# Patient Record
Sex: FEMALE | Race: WHITE | NOT HISPANIC OR LATINO | URBAN - METROPOLITAN AREA
[De-identification: names, ages, dates, MRNs, and addresses within clinical notes are randomized per-mention and may not be internally consistent; named-entity substitution may affect disease eponyms.]

---

## 2019-05-06 ENCOUNTER — ON CAMPUS - OUTPATIENT (AMBULATORY)
Dept: URBAN - METROPOLITAN AREA HOSPITAL 2 | Facility: HOSPITAL | Age: 49
End: 2019-05-06
Payer: COMMERCIAL

## 2019-05-06 ENCOUNTER — OFFICE (AMBULATORY)
Dept: URBAN - METROPOLITAN AREA PATHOLOGY 4 | Facility: PATHOLOGY | Age: 49
End: 2019-05-06
Payer: COMMERCIAL

## 2019-05-06 VITALS
DIASTOLIC BLOOD PRESSURE: 71 MMHG | DIASTOLIC BLOOD PRESSURE: 72 MMHG | DIASTOLIC BLOOD PRESSURE: 65 MMHG | HEART RATE: 70 BPM | RESPIRATION RATE: 20 BRPM | SYSTOLIC BLOOD PRESSURE: 105 MMHG | HEART RATE: 75 BPM | SYSTOLIC BLOOD PRESSURE: 93 MMHG | HEART RATE: 66 BPM | SYSTOLIC BLOOD PRESSURE: 120 MMHG | OXYGEN SATURATION: 98 % | RESPIRATION RATE: 14 BRPM | RESPIRATION RATE: 18 BRPM | HEIGHT: 68 IN | DIASTOLIC BLOOD PRESSURE: 58 MMHG | DIASTOLIC BLOOD PRESSURE: 83 MMHG | DIASTOLIC BLOOD PRESSURE: 54 MMHG | OXYGEN SATURATION: 99 % | DIASTOLIC BLOOD PRESSURE: 77 MMHG | DIASTOLIC BLOOD PRESSURE: 60 MMHG | WEIGHT: 293 LBS | SYSTOLIC BLOOD PRESSURE: 110 MMHG | HEART RATE: 59 BPM | SYSTOLIC BLOOD PRESSURE: 142 MMHG | RESPIRATION RATE: 16 BRPM | TEMPERATURE: 97.5 F | HEART RATE: 64 BPM | DIASTOLIC BLOOD PRESSURE: 70 MMHG | DIASTOLIC BLOOD PRESSURE: 67 MMHG | HEART RATE: 67 BPM | OXYGEN SATURATION: 100 % | HEART RATE: 72 BPM | HEART RATE: 65 BPM | SYSTOLIC BLOOD PRESSURE: 98 MMHG | SYSTOLIC BLOOD PRESSURE: 129 MMHG | SYSTOLIC BLOOD PRESSURE: 97 MMHG | SYSTOLIC BLOOD PRESSURE: 111 MMHG | SYSTOLIC BLOOD PRESSURE: 114 MMHG

## 2019-05-06 DIAGNOSIS — K64.8 OTHER HEMORRHOIDS: ICD-10-CM

## 2019-05-06 DIAGNOSIS — D12.3 BENIGN NEOPLASM OF TRANSVERSE COLON: ICD-10-CM

## 2019-05-06 DIAGNOSIS — D12.5 BENIGN NEOPLASM OF SIGMOID COLON: ICD-10-CM

## 2019-05-06 DIAGNOSIS — Z80.0 FAMILY HISTORY OF MALIGNANT NEOPLASM OF DIGESTIVE ORGANS: ICD-10-CM

## 2019-05-06 DIAGNOSIS — K64.4 RESIDUAL HEMORRHOIDAL SKIN TAGS: ICD-10-CM

## 2019-05-06 LAB
GI HISTOLOGY: A. UNSPECIFIED: (no result)
GI HISTOLOGY: B. UNSPECIFIED: (no result)
GI HISTOLOGY: PDF REPORT: (no result)

## 2019-05-06 PROCEDURE — 88305 TISSUE EXAM BY PATHOLOGIST: CPT | Mod: 33 | Performed by: INTERNAL MEDICINE

## 2019-05-06 PROCEDURE — 45380 COLONOSCOPY AND BIOPSY: CPT | Mod: 59,33 | Performed by: INTERNAL MEDICINE

## 2019-05-06 PROCEDURE — 45385 COLONOSCOPY W/LESION REMOVAL: CPT | Mod: 33 | Performed by: INTERNAL MEDICINE

## 2019-05-06 PROCEDURE — 45380 COLONOSCOPY AND BIOPSY: CPT | Mod: 33,59 | Performed by: INTERNAL MEDICINE

## 2022-05-20 ENCOUNTER — ON CAMPUS - OUTPATIENT (AMBULATORY)
Dept: URBAN - METROPOLITAN AREA HOSPITAL 2 | Facility: HOSPITAL | Age: 52
End: 2022-05-20
Payer: COMMERCIAL

## 2022-05-20 ENCOUNTER — OFFICE (AMBULATORY)
Dept: URBAN - METROPOLITAN AREA PATHOLOGY 4 | Facility: PATHOLOGY | Age: 52
End: 2022-05-20
Payer: COMMERCIAL

## 2022-05-20 VITALS
DIASTOLIC BLOOD PRESSURE: 93 MMHG | OXYGEN SATURATION: 98 % | DIASTOLIC BLOOD PRESSURE: 52 MMHG | RESPIRATION RATE: 16 BRPM | TEMPERATURE: 97.8 F | RESPIRATION RATE: 17 BRPM | DIASTOLIC BLOOD PRESSURE: 85 MMHG | SYSTOLIC BLOOD PRESSURE: 128 MMHG | OXYGEN SATURATION: 97 % | HEART RATE: 59 BPM | HEART RATE: 60 BPM | OXYGEN SATURATION: 95 % | WEIGHT: 263 LBS | OXYGEN SATURATION: 100 % | HEART RATE: 66 BPM | SYSTOLIC BLOOD PRESSURE: 103 MMHG | HEART RATE: 57 BPM | OXYGEN SATURATION: 99 % | DIASTOLIC BLOOD PRESSURE: 72 MMHG | SYSTOLIC BLOOD PRESSURE: 118 MMHG | SYSTOLIC BLOOD PRESSURE: 121 MMHG | HEART RATE: 63 BPM | RESPIRATION RATE: 23 BRPM | DIASTOLIC BLOOD PRESSURE: 78 MMHG | SYSTOLIC BLOOD PRESSURE: 143 MMHG | RESPIRATION RATE: 15 BRPM | OXYGEN SATURATION: 96 % | SYSTOLIC BLOOD PRESSURE: 146 MMHG | HEIGHT: 68 IN | SYSTOLIC BLOOD PRESSURE: 126 MMHG | HEART RATE: 65 BPM | DIASTOLIC BLOOD PRESSURE: 60 MMHG | HEART RATE: 62 BPM

## 2022-05-20 DIAGNOSIS — D12.3 BENIGN NEOPLASM OF TRANSVERSE COLON: ICD-10-CM

## 2022-05-20 DIAGNOSIS — Z86.010 PERSONAL HISTORY OF COLONIC POLYPS: ICD-10-CM

## 2022-05-20 PROBLEM — K63.5 POLYP OF COLON: Status: ACTIVE | Noted: 2022-05-20

## 2022-05-20 LAB
GI HISTOLOGY: A. UNSPECIFIED: (no result)
GI HISTOLOGY: PDF REPORT: (no result)

## 2022-05-20 PROCEDURE — 88305 TISSUE EXAM BY PATHOLOGIST: CPT | Performed by: INTERNAL MEDICINE

## 2022-05-20 PROCEDURE — 45385 COLONOSCOPY W/LESION REMOVAL: CPT | Mod: 33 | Performed by: INTERNAL MEDICINE

## 2024-01-17 ENCOUNTER — OFFICE VISIT (OUTPATIENT)
Dept: PODIATRY | Facility: CLINIC | Age: 54
End: 2024-01-17
Payer: COMMERCIAL

## 2024-01-17 VITALS — BODY MASS INDEX: 42.74 KG/M2 | WEIGHT: 282 LBS | RESPIRATION RATE: 20 BRPM | HEIGHT: 68 IN

## 2024-01-17 DIAGNOSIS — M25.571 ACUTE RIGHT ANKLE PAIN: Primary | ICD-10-CM

## 2024-01-17 DIAGNOSIS — E66.01 MORBID OBESITY WITH BMI OF 40.0-44.9, ADULT: ICD-10-CM

## 2024-01-17 DIAGNOSIS — M21.861 ACQUIRED POSTERIOR EQUINUS, RIGHT: ICD-10-CM

## 2024-01-17 DIAGNOSIS — M77.31 CALCANEAL SPUR, RIGHT: ICD-10-CM

## 2024-01-17 DIAGNOSIS — M77.41 METATARSALGIA, RIGHT FOOT: ICD-10-CM

## 2024-01-17 DIAGNOSIS — M92.61 HAGLUND'S DEFORMITY, RIGHT: ICD-10-CM

## 2024-01-17 DIAGNOSIS — M76.61 ACHILLES TENDINITIS, RIGHT LEG: ICD-10-CM

## 2024-01-17 RX ORDER — BACLOFEN 10 MG/1
TABLET ORAL
COMMUNITY
Start: 2023-11-30

## 2024-01-17 RX ORDER — PREDNISONE 20 MG/1
TABLET ORAL
COMMUNITY
Start: 2023-10-27

## 2024-01-17 RX ORDER — TRIAMCINOLONE ACETONIDE 40 MG/ML
20 INJECTION, SUSPENSION INTRA-ARTICULAR; INTRAMUSCULAR ONCE
Status: COMPLETED | OUTPATIENT
Start: 2024-01-17 | End: 2024-01-17

## 2024-01-17 RX ADMIN — TRIAMCINOLONE ACETONIDE 20 MG: 40 INJECTION, SUSPENSION INTRA-ARTICULAR; INTRAMUSCULAR at 10:28

## 2024-01-17 NOTE — PROGRESS NOTES
01/17/2024  Foot and Ankle Surgery - New Patient   Provider: Dr. Donald Emanuel DPM  Location: [facility]    Subjective:  Alfreda Mcgowan is a 53 y.o. female.     Chief Complaint   Patient presents with    Right Ankle - Pain    Initial Evaluation     PALMER Larios md 10/15/2023       HPI:   The patient is a 53-year-old female who presents to the office for issues involving the right lower extremity.       Right foot pain   The patient reports that her right heel has been swelling. She states that this has been going on for a few years. She reports that it will flare up and then go away. The patient states that this time it has been painful and swollen for months now. She notes that this flare up has been going on since 07/2023. She states that she had been seen for this issue 3 years ago when she broke her great toe. She was told it may have been an old injury to the tendon; it was possible she may have had tears in it. She notes that she used a bone stimulator for some time, however; the pain did not stop. The patient reports that the toe does not bend any longer. 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 She notes that the laast time she had to wear a boot it caused hip pain.     No Known Allergies    Past Medical History:   Diagnosis Date    Difficulty walking     Plantar fasciitis     Stress fracture        History reviewed. No pertinent surgical history.    Family History   Problem Relation Age of Onset    Cancer Mother         Lung cancer    Heart disease Mother     Hypertension Mother     Cancer Father         Colon    Cancer Sister         Lung    Cancer Maternal Aunt         Breast       Social History     Socioeconomic History    Marital status:    Tobacco Use    Smoking status: Never     Passive exposure: Never    Smokeless tobacco: Never   Vaping Use    Vaping Use: Never used   Substance and Sexual Activity    Alcohol use: Yes     Alcohol/week: 2.0  standard drinks of alcohol     Types: 2 Standard drinks or equivalent per week    Drug use: Never    Sexual activity: Yes     Partners: Male     Birth control/protection: Hysterectomy        Current Outpatient Medications on File Prior to Visit   Medication Sig Dispense Refill    cetirizine (zyrTEC) 10 MG tablet Take 1 tablet by mouth Daily.      hydroCHLOROthiazide (HYDRODIURIL) 25 MG tablet Take 1 tablet by mouth Daily.      levothyroxine (SYNTHROID, LEVOTHROID) 125 MCG tablet Take 1 tablet by mouth Daily.      LORazepam (ATIVAN) 0.5 MG tablet TAKE 1 TABLET BY MOUTH DAILY AS NEEDED FOR PANIC ATTACK      rosuvastatin (CRESTOR) 5 MG tablet Take 1 tablet by mouth Daily.      sertraline (ZOLOFT) 100 MG tablet TAKE 1 TABLET BY MOUTH EVERY DAY FOR MOOD      baclofen (LIORESAL) 10 MG tablet 1 (ONE) TABLET THREE TIMES DAILY, AS NEEDED FOR BACK (Patient not taking: Reported on 1/17/2024)      predniSONE (DELTASONE) 20 MG tablet TAKE 3 (THREE) TABLET DAILY X 2 DAYS THEN 2 TABLETS DAILY X 2 DAYS THEN ONE DAILY (Patient not taking: Reported on 1/17/2024)       No current facility-administered medications on file prior to visit.       Review of Systems:  General: Denies fever, chills, fatigue, and weakness.  Eyes: Denies vision loss, blurry vision, and excessive redness.  ENT: Denies hearing issues and difficulty swallowing.  Cardiovascular: Denies palpitations, chest pain, or syncopal episodes.  Respiratory: Denies shortness of breath, wheezing, and coughing.  GI: Denies abdominal pain, nausea, and vomiting.   : Denies frequency, hematuria, and urgency.  Musculoskeletal: Denies muscle cramps, joint pains, and stiffness.  Derm: Denies rash, open wounds, or suspicious lesions.  Neuro: Denies headaches, numbness, loss of coordination, and tremors.  Psych: Denies anxiety and depression.  Endocrine: Denies temperature intolerance and changes in appetite.  Heme: Denies bleeding disorders or abnormal bruising.     Objective   Resp  "20   Ht 172.7 cm (68\")   Wt 128 kg (282 lb)   BMI 42.88 kg/m²     Foot/Ankle Exam    GENERAL  Appearance:  appears stated age and obese  Orientation:  AAOx3  Affect:  appropriate  Gait:  unimpaired  Assistance:  independent  Right shoe gear: casual shoe  Left shoe gear: casual shoe    VASCULAR     Right Foot Vascularity   Normal vascular exam    Dorsalis pedis:  2+  Posterior tibial:  2+  Skin temperature:  warm  Edema grading:  None  CFT:  < 3 seconds  Pedal hair growth:  Present  Varicosities:  none     Left Foot Vascularity   Normal vascular exam    Dorsalis pedis:  2+  Posterior tibial:  2+  Skin temperature:  warm  Edema grading:  None  CFT:  < 3 seconds  Pedal hair growth:  Present  Varicosities:  none     NEUROLOGIC     Right Foot Neurologic   Normal sensation    Light touch sensation: normal  Vibratory sensation: normal  Hot/Cold sensation: normal  Normal reflexes    Achilles reflex:  2+  Babinski reflex:  2+     Left Foot Neurologic   Normal sensation    Light touch sensation: normal  Vibratory sensation: normal  Hot/Cold sensation:  normal  Normal reflexes    Achilles reflex:  2+  Babinski reflex:  2+    MUSCULOSKELETAL     Right Foot Musculoskeletal   Ecchymosis:  none  Arch:  Normal     Left Foot Musculoskeletal   Ecchymosis:  none  Arch:  Normal    MUSCLE STRENGTH     Right Foot Muscle Strength   Normal strength    Foot dorsiflexion:  5  Foot plantar flexion:  5  Foot inversion:  5  Foot eversion:  5     Left Foot Muscle Strength   Normal strength    Foot dorsiflexion:  5  Foot plantar flexion:  5  Foot inversion:  5  Foot eversion:  5    RANGE OF MOTION     Right Foot Range of Motion   Foot and ankle ROM within normal limits       Left Foot Range of Motion   Foot and ankle ROM within normal limits      DERMATOLOGIC      Right Foot Dermatologic   Skin  Right foot skin is intact.      Left Foot Dermatologic   Skin  Left foot skin is intact.     01/17/2024  Prominent discomfort with palpation involving " the posterior aspect of the right calcaneus. Moderate hypertrophy involving the Achilles tendon insertion no obvious defect involving the Achilles tendon. Discomfort involving the forefoot plantar forefoot, moderate soft tissue rigidity and equinus contracture. No obvious deformity or instability. Insert Achilles or retrocalcaneal injection template.     Assessment & Plan   Diagnoses and all orders for this visit:    1. Acute right ankle pain (Primary)  -     XR Ankle 3+ View Right  -     Injection Tendon or Ligament  -     triamcinolone acetonide (KENALOG-40) injection 20 mg    2. Achilles tendinitis, right leg  -     XR Toe 2+ View Right    3. Romi's deformity, right    4. Acquired posterior equinus, right    5. Metatarsalgia, right foot    6. Calcaneal spur, right    7. Morbid obesity with BMI of 40.0-44.9, adult      Patient is a 53-year-old female that presents with longstanding pain and discomfort involving the posterior aspect of the right heel.  She states that these issues have been bothering her for several years intermittently.  She has seen somebody in the past for these issues.  Today, she has significant swelling and discomfort limiting her overall activity.  I reviewed the diagnoses, treatment options, biomechanics of these issues.  I have suggested that she consider a steroid injection today for symptom management.  We reviewed the risks and benefits.  Procedure was performed without complication.  I have also advised her on appropriate shoes and inserts.  We discussed proper activity level and to avoid high-impact and uneven terrain.  I do feel that she would benefit from low impact exercise.  We discussed RICE therapy and proper use of OTC anti-inflammatories.  I have suggested that she start routine stretching and manual therapy exercises.  We did discuss consideration of physical therapy but she does not want to consider at this time.  I have asked that she follow-up with me in 4 to 6 weeks  for reevaluation and further planning.  We did briefly discuss surgical options.    Retrocalcaneal/ Achilles Steroid Injection: Right foot    Consent and time out was performed before proceeding with injection.  The skin about the lateral Kagar's triangle region of the right foot was cleansed with alcohol.  Ultrasound guidance was used to evaluate the Achilles tendon and injection guidance. Using an aseptic technique, a 1.5 mL solution containing 0.5 mL of 0.5% Marcaine plain, 0.5mL of 1% lidocaine plain and 0.5 mL of Kenalog was injected to the insertion site of the Achilles tendon. After the injection, compression was applied followed by a sterile bandage.  The patient noted relief from pain and tolerated the injection well without complication.    Greater than 30 minutes spent before coming to coming after evaluation for patient care.    Orders Placed This Encounter   Procedures    Injection Tendon or Ligament     Order Specific Question:   Release to patient     Answer:   Routine Release [[id number]]    XR Ankle 3+ View Right     Order Specific Question:   Reason for Exam:     Answer:   pain in achilles rm 13 wb     Order Specific Question:   Release to patient     Answer:   Routine Release [[id number]]    XR Toe 2+ View Right     Order Specific Question:   Reason for Exam:     Answer:   prev great toe fx rm 13 wb     Order Specific Question:   Release to patient     Answer:   Routine Release [[id number]]        Note is dictated utilizing voice recognition software. Unfortunately this leads to occasional typographical errors. I apologize in advance if the situation occurs. If questions occur please do not hesitate to call our office.    Transcribed from ambient dictation for MARY Emanuel DPM by Haley Lee.  01/17/24   09:45 EST    Patient or patient representative verbalized consent to the visit recording.

## 2024-01-18 ENCOUNTER — PATIENT ROUNDING (BHMG ONLY) (OUTPATIENT)
Dept: ORTHOPEDIC SURGERY | Facility: CLINIC | Age: 54
End: 2024-01-18
Payer: COMMERCIAL

## 2024-01-18 NOTE — PROGRESS NOTES
A My-Chart message has been sent to the patient for PATIENT ROUNDING with [facility]

## 2024-02-22 ENCOUNTER — OFFICE VISIT (OUTPATIENT)
Dept: PODIATRY | Facility: CLINIC | Age: 54
End: 2024-02-22
Payer: COMMERCIAL

## 2024-02-22 VITALS — HEART RATE: 88 BPM | HEIGHT: 68 IN | WEIGHT: 266 LBS | BODY MASS INDEX: 40.32 KG/M2 | RESPIRATION RATE: 20 BRPM

## 2024-02-22 DIAGNOSIS — M92.61 HAGLUND'S DEFORMITY, RIGHT: ICD-10-CM

## 2024-02-22 DIAGNOSIS — E66.01 MORBID OBESITY WITH BMI OF 40.0-44.9, ADULT: ICD-10-CM

## 2024-02-22 DIAGNOSIS — M77.41 METATARSALGIA, RIGHT FOOT: ICD-10-CM

## 2024-02-22 DIAGNOSIS — M76.61 ACHILLES TENDINITIS, RIGHT LEG: ICD-10-CM

## 2024-02-22 DIAGNOSIS — M25.571 ACUTE RIGHT ANKLE PAIN: Primary | ICD-10-CM

## 2024-02-22 DIAGNOSIS — M21.861 ACQUIRED POSTERIOR EQUINUS, RIGHT: ICD-10-CM

## 2024-02-22 DIAGNOSIS — M77.31 CALCANEAL SPUR, RIGHT: ICD-10-CM

## 2024-02-23 NOTE — PROGRESS NOTES
"02/22/2024  Foot and Ankle Surgery - Established Patient/Follow-up  Provider: Dr. Donald Emanuel DPM  Location: [facility]    Subjective:  Alfreda Mcgowan is a 53 y.o. female.     Chief Complaint   Patient presents with    Right Ankle - Follow-up, Pain    Follow-up     PALMER Larios md  10/15/2023       HPI: Patient returns approximate month after steroid injection with continued pain involving the posterior aspect of the heel.  She did notice some improvement after the steroid injection but feels that symptoms are getting worse.  She is tired of dealing with the pain and limitation would like to discuss alternative options.    No Known Allergies    Current Outpatient Medications on File Prior to Visit   Medication Sig Dispense Refill    baclofen (LIORESAL) 10 MG tablet       cetirizine (zyrTEC) 10 MG tablet Take 1 tablet by mouth Daily.      hydroCHLOROthiazide (HYDRODIURIL) 25 MG tablet Take 1 tablet by mouth Daily.      levothyroxine (SYNTHROID, LEVOTHROID) 125 MCG tablet Take 1 tablet by mouth Daily.      LORazepam (ATIVAN) 0.5 MG tablet TAKE 1 TABLET BY MOUTH DAILY AS NEEDED FOR PANIC ATTACK      rosuvastatin (CRESTOR) 5 MG tablet Take 1 tablet by mouth Daily.      sertraline (ZOLOFT) 100 MG tablet TAKE 1 TABLET BY MOUTH EVERY DAY FOR MOOD      predniSONE (DELTASONE) 20 MG tablet TAKE 3 (THREE) TABLET DAILY X 2 DAYS THEN 2 TABLETS DAILY X 2 DAYS THEN ONE DAILY (Patient not taking: Reported on 1/17/2024)       No current facility-administered medications on file prior to visit.       Objective   Pulse 88   Resp 20   Ht 172.7 cm (68\")   Wt 121 kg (266 lb)   BMI 40.45 kg/m²     GENERAL  Appearance:  appears stated age and obese  Orientation:  AAOx3  Affect:  appropriate  Gait:  unimpaired  Assistance:  independent  Right shoe gear: casual shoe  Left shoe gear: casual shoe     VASCULAR      Right Foot Vascularity   Normal vascular exam    Dorsalis pedis:  2+  Posterior tibial:  2+  Skin temperature:  " warm  Edema grading:  None  CFT:  < 3 seconds  Pedal hair growth:  Present  Varicosities:  none      Left Foot Vascularity   Normal vascular exam    Dorsalis pedis:  2+  Posterior tibial:  2+  Skin temperature:  warm  Edema grading:  None  CFT:  < 3 seconds  Pedal hair growth:  Present  Varicosities:  none     NEUROLOGIC      Right Foot Neurologic   Normal sensation    Light touch sensation: normal  Vibratory sensation: normal  Hot/Cold sensation: normal  Normal reflexes    Achilles reflex:  2+  Babinski reflex:  2+      Left Foot Neurologic   Normal sensation    Light touch sensation: normal  Vibratory sensation: normal  Hot/Cold sensation:  normal  Normal reflexes    Achilles reflex:  2+  Babinski reflex:  2+     MUSCULOSKELETAL      Right Foot Musculoskeletal   Ecchymosis:  none  Arch:  Normal      Left Foot Musculoskeletal   Ecchymosis:  none  Arch:  Normal     MUSCLE STRENGTH      Right Foot Muscle Strength   Normal strength    Foot dorsiflexion:  5  Foot plantar flexion:  5  Foot inversion:  5  Foot eversion:  5      Left Foot Muscle Strength   Normal strength    Foot dorsiflexion:  5  Foot plantar flexion:  5  Foot inversion:  5  Foot eversion:  5     RANGE OF MOTION      Right Foot Range of Motion   Foot and ankle ROM within normal limits        Left Foot Range of Motion   Foot and ankle ROM within normal limits       DERMATOLOGIC       Right Foot Dermatologic   Skin  Right foot skin is intact.       Left Foot Dermatologic   Skin  Left foot skin is intact.      01/17/2024  Prominent discomfort with palpation involving the posterior aspect of the right calcaneus. Moderate hypertrophy involving the Achilles tendon insertion no obvious defect involving the Achilles tendon. Discomfort involving the forefoot plantar forefoot, moderate soft tissue rigidity and equinus contracture. No obvious deformity or instability. Insert Achilles or retrocalcaneal injection template.     2/23/24: Continued swelling and  discomfort involving the posterior aspect of the heel.  No Achilles tendon defect.  Moderate hypertrophy    Assessment & Plan   Diagnoses and all orders for this visit:    1. Acute right ankle pain (Primary)    2. Achilles tendinitis, right leg  -     Case Request; Standing  -     CBC (No Diff); Future  -     Basic Metabolic Panel; Future  -     XR Chest 2 View; Future  -     ECG 12 Lead; Future  -     ceFAZolin (ANCEF) 3,000 mg in sodium chloride 0.9 % 100 mL IVPB  -     Case Request    3. Romi's deformity, right  -     Case Request; Standing  -     CBC (No Diff); Future  -     Basic Metabolic Panel; Future  -     XR Chest 2 View; Future  -     ECG 12 Lead; Future  -     ceFAZolin (ANCEF) 3,000 mg in sodium chloride 0.9 % 100 mL IVPB  -     Case Request    4. Acquired posterior equinus, right  -     Case Request; Standing  -     CBC (No Diff); Future  -     Basic Metabolic Panel; Future  -     XR Chest 2 View; Future  -     ECG 12 Lead; Future  -     ceFAZolin (ANCEF) 3,000 mg in sodium chloride 0.9 % 100 mL IVPB  -     Case Request    5. Metatarsalgia, right foot    6. Calcaneal spur, right    7. Morbid obesity with BMI of 40.0-44.9, adult    Other orders  -     Follow Anesthesia Guidelines / Protocol; Future  -     Follow Anesthesia Guidelines / Protocol; Standing  -     Verify / Perform Chlorhexidine Skin Prep; Standing  -     Verify / Perform Chlorhexidine Skin Prep if Indicated (If Not Already Completed); Standing  -     Obtain Informed Consent; Future  -     Provide NPO Instructions to Patient; Future  -     Chlorhexidine Skin Prep; Future      Patient returns with continued pain involving the posterior aspect of the right heel.  She is worried that symptoms are getting worse.  She has not noticed any significant improvement after the steroid injection.  She continues to have prominent tightness involving the posterior aspect of the leg and pain to the heel with weightbearing activities.  She has been  wearing the inserts and offloading shoes.  I did discuss alternative options with her which would require gastrocnemius recession and Romi's removal.  I discussed the procedures, risk, goals, and recovery with her at length.  Patient understands and would like to proceed.  I have suggested that we consider surgery in the near future.  Patient is to decrease overall activity during that timeframe.  We did discuss rice therapy and proper use of OTC anti-inflammatories.  Greater than 30 minutes spent before, during, and after evaluation for patient care.    Orders Placed This Encounter   Procedures    XR Chest 2 View     Standing Status:   Future     Standing Expiration Date:   2/22/2025     Order Specific Question:   Reason for Exam:     Answer:   Preop     Order Specific Question:   Release to patient     Answer:   Routine Release [[id number]]    CBC (No Diff)     Standing Status:   Future     Standing Expiration Date:   2/22/2025     Order Specific Question:   Release to patient     Answer:   Routine Release [[id number]]    Basic Metabolic Panel     Standing Status:   Future     Standing Expiration Date:   2/22/2025     Order Specific Question:   Release to patient     Answer:   Routine Release [[id number]]    Obtain Informed Consent     Standing Status:   Future     Order Specific Question:   Informed Consent Given For     Answer:   Gastrocnemius recession with Romi's removal to the right lower extremity    Provide NPO Instructions to Patient     Standing Status:   Future    Chlorhexidine Skin Prep     Chlorhexidine Skin Prep and Instructions For All Patients Having A Procedure Requiring an Outward Incision if Not Allergic. If Allergic, Give Antibacterial Skin Wipes and Instructions. Do Not Use For Facial Cases or on Any Mucus Membranes.     Standing Status:   Future    ECG 12 Lead     Standing Status:   Future     Standing Expiration Date:   2/22/2025     Order Specific Question:   Reason for Exam:      Answer:   Preop     Order Specific Question:   Release to patient     Answer:   Routine Release [[id number]]          Note is dictated utilizing voice recognition software. Unfortunately this leads to occasional typographical errors. I apologize in advance if the situation occurs. If questions occur please do not hesitate to call our office.

## 2024-02-23 NOTE — H&P (VIEW-ONLY)
"02/22/2024  Foot and Ankle Surgery - Established Patient/Follow-up  Provider: Dr. Donald Emanuel DPM  Location: [facility]    Subjective:  Alfreda Mcgowan is a 53 y.o. female.     Chief Complaint   Patient presents with    Right Ankle - Follow-up, Pain    Follow-up     PALMER Larios md  10/15/2023       HPI: Patient returns approximate month after steroid injection with continued pain involving the posterior aspect of the heel.  She did notice some improvement after the steroid injection but feels that symptoms are getting worse.  She is tired of dealing with the pain and limitation would like to discuss alternative options.    No Known Allergies    Current Outpatient Medications on File Prior to Visit   Medication Sig Dispense Refill    baclofen (LIORESAL) 10 MG tablet       cetirizine (zyrTEC) 10 MG tablet Take 1 tablet by mouth Daily.      hydroCHLOROthiazide (HYDRODIURIL) 25 MG tablet Take 1 tablet by mouth Daily.      levothyroxine (SYNTHROID, LEVOTHROID) 125 MCG tablet Take 1 tablet by mouth Daily.      LORazepam (ATIVAN) 0.5 MG tablet TAKE 1 TABLET BY MOUTH DAILY AS NEEDED FOR PANIC ATTACK      rosuvastatin (CRESTOR) 5 MG tablet Take 1 tablet by mouth Daily.      sertraline (ZOLOFT) 100 MG tablet TAKE 1 TABLET BY MOUTH EVERY DAY FOR MOOD      predniSONE (DELTASONE) 20 MG tablet TAKE 3 (THREE) TABLET DAILY X 2 DAYS THEN 2 TABLETS DAILY X 2 DAYS THEN ONE DAILY (Patient not taking: Reported on 1/17/2024)       No current facility-administered medications on file prior to visit.       Objective   Pulse 88   Resp 20   Ht 172.7 cm (68\")   Wt 121 kg (266 lb)   BMI 40.45 kg/m²     GENERAL  Appearance:  appears stated age and obese  Orientation:  AAOx3  Affect:  appropriate  Gait:  unimpaired  Assistance:  independent  Right shoe gear: casual shoe  Left shoe gear: casual shoe     VASCULAR      Right Foot Vascularity   Normal vascular exam    Dorsalis pedis:  2+  Posterior tibial:  2+  Skin temperature:  " warm  Edema grading:  None  CFT:  < 3 seconds  Pedal hair growth:  Present  Varicosities:  none      Left Foot Vascularity   Normal vascular exam    Dorsalis pedis:  2+  Posterior tibial:  2+  Skin temperature:  warm  Edema grading:  None  CFT:  < 3 seconds  Pedal hair growth:  Present  Varicosities:  none     NEUROLOGIC      Right Foot Neurologic   Normal sensation    Light touch sensation: normal  Vibratory sensation: normal  Hot/Cold sensation: normal  Normal reflexes    Achilles reflex:  2+  Babinski reflex:  2+      Left Foot Neurologic   Normal sensation    Light touch sensation: normal  Vibratory sensation: normal  Hot/Cold sensation:  normal  Normal reflexes    Achilles reflex:  2+  Babinski reflex:  2+     MUSCULOSKELETAL      Right Foot Musculoskeletal   Ecchymosis:  none  Arch:  Normal      Left Foot Musculoskeletal   Ecchymosis:  none  Arch:  Normal     MUSCLE STRENGTH      Right Foot Muscle Strength   Normal strength    Foot dorsiflexion:  5  Foot plantar flexion:  5  Foot inversion:  5  Foot eversion:  5      Left Foot Muscle Strength   Normal strength    Foot dorsiflexion:  5  Foot plantar flexion:  5  Foot inversion:  5  Foot eversion:  5     RANGE OF MOTION      Right Foot Range of Motion   Foot and ankle ROM within normal limits        Left Foot Range of Motion   Foot and ankle ROM within normal limits       DERMATOLOGIC       Right Foot Dermatologic   Skin  Right foot skin is intact.       Left Foot Dermatologic   Skin  Left foot skin is intact.      01/17/2024  Prominent discomfort with palpation involving the posterior aspect of the right calcaneus. Moderate hypertrophy involving the Achilles tendon insertion no obvious defect involving the Achilles tendon. Discomfort involving the forefoot plantar forefoot, moderate soft tissue rigidity and equinus contracture. No obvious deformity or instability. Insert Achilles or retrocalcaneal injection template.     2/23/24: Continued swelling and  discomfort involving the posterior aspect of the heel.  No Achilles tendon defect.  Moderate hypertrophy    Assessment & Plan   Diagnoses and all orders for this visit:    1. Acute right ankle pain (Primary)    2. Achilles tendinitis, right leg  -     Case Request; Standing  -     CBC (No Diff); Future  -     Basic Metabolic Panel; Future  -     XR Chest 2 View; Future  -     ECG 12 Lead; Future  -     ceFAZolin (ANCEF) 3,000 mg in sodium chloride 0.9 % 100 mL IVPB  -     Case Request    3. Romi's deformity, right  -     Case Request; Standing  -     CBC (No Diff); Future  -     Basic Metabolic Panel; Future  -     XR Chest 2 View; Future  -     ECG 12 Lead; Future  -     ceFAZolin (ANCEF) 3,000 mg in sodium chloride 0.9 % 100 mL IVPB  -     Case Request    4. Acquired posterior equinus, right  -     Case Request; Standing  -     CBC (No Diff); Future  -     Basic Metabolic Panel; Future  -     XR Chest 2 View; Future  -     ECG 12 Lead; Future  -     ceFAZolin (ANCEF) 3,000 mg in sodium chloride 0.9 % 100 mL IVPB  -     Case Request    5. Metatarsalgia, right foot    6. Calcaneal spur, right    7. Morbid obesity with BMI of 40.0-44.9, adult    Other orders  -     Follow Anesthesia Guidelines / Protocol; Future  -     Follow Anesthesia Guidelines / Protocol; Standing  -     Verify / Perform Chlorhexidine Skin Prep; Standing  -     Verify / Perform Chlorhexidine Skin Prep if Indicated (If Not Already Completed); Standing  -     Obtain Informed Consent; Future  -     Provide NPO Instructions to Patient; Future  -     Chlorhexidine Skin Prep; Future      Patient returns with continued pain involving the posterior aspect of the right heel.  She is worried that symptoms are getting worse.  She has not noticed any significant improvement after the steroid injection.  She continues to have prominent tightness involving the posterior aspect of the leg and pain to the heel with weightbearing activities.  She has been  wearing the inserts and offloading shoes.  I did discuss alternative options with her which would require gastrocnemius recession and Romi's removal.  I discussed the procedures, risk, goals, and recovery with her at length.  Patient understands and would like to proceed.  I have suggested that we consider surgery in the near future.  Patient is to decrease overall activity during that timeframe.  We did discuss rice therapy and proper use of OTC anti-inflammatories.  Greater than 30 minutes spent before, during, and after evaluation for patient care.    Orders Placed This Encounter   Procedures    XR Chest 2 View     Standing Status:   Future     Standing Expiration Date:   2/22/2025     Order Specific Question:   Reason for Exam:     Answer:   Preop     Order Specific Question:   Release to patient     Answer:   Routine Release [[id number]]    CBC (No Diff)     Standing Status:   Future     Standing Expiration Date:   2/22/2025     Order Specific Question:   Release to patient     Answer:   Routine Release [[id number]]    Basic Metabolic Panel     Standing Status:   Future     Standing Expiration Date:   2/22/2025     Order Specific Question:   Release to patient     Answer:   Routine Release [[id number]]    Obtain Informed Consent     Standing Status:   Future     Order Specific Question:   Informed Consent Given For     Answer:   Gastrocnemius recession with Romi's removal to the right lower extremity    Provide NPO Instructions to Patient     Standing Status:   Future    Chlorhexidine Skin Prep     Chlorhexidine Skin Prep and Instructions For All Patients Having A Procedure Requiring an Outward Incision if Not Allergic. If Allergic, Give Antibacterial Skin Wipes and Instructions. Do Not Use For Facial Cases or on Any Mucus Membranes.     Standing Status:   Future    ECG 12 Lead     Standing Status:   Future     Standing Expiration Date:   2/22/2025     Order Specific Question:   Reason for Exam:      Answer:   Preop     Order Specific Question:   Release to patient     Answer:   Routine Release [[id number]]          Note is dictated utilizing voice recognition software. Unfortunately this leads to occasional typographical errors. I apologize in advance if the situation occurs. If questions occur please do not hesitate to call our office.

## 2024-02-27 ENCOUNTER — TELEPHONE (OUTPATIENT)
Dept: PODIATRY | Facility: CLINIC | Age: 54
End: 2024-02-27
Payer: COMMERCIAL

## 2024-02-27 PROBLEM — M21.861 ACQUIRED POSTERIOR EQUINUS, RIGHT: Status: ACTIVE | Noted: 2024-02-22

## 2024-02-27 PROBLEM — M76.61 ACHILLES TENDINITIS, RIGHT LEG: Status: ACTIVE | Noted: 2024-02-22

## 2024-02-27 PROBLEM — M92.61 HAGLUND'S DEFORMITY, RIGHT: Status: ACTIVE | Noted: 2024-02-22

## 2024-02-27 NOTE — TELEPHONE ENCOUNTER
Caller: Alfreda Quinonez    Relationship to patient: Self    Best call back number:     Chief complaint: RT ANKLE    Type of visit: RECESSION GASTROCNEMIUS SX    Requested date: ASAP    Additional notes:MS QUINONEZ WOULD LIKE A CALL BACK TO SCHEDULE SX

## 2024-02-29 ENCOUNTER — HOSPITAL ENCOUNTER (OUTPATIENT)
Dept: CARDIOLOGY | Facility: HOSPITAL | Age: 54
Discharge: HOME | End: 2024-02-29
Payer: COMMERCIAL

## 2024-02-29 ENCOUNTER — LAB (OUTPATIENT)
Dept: LAB | Facility: HOSPITAL | Age: 54
End: 2024-02-29
Payer: COMMERCIAL

## 2024-02-29 ENCOUNTER — HOSPITAL ENCOUNTER (OUTPATIENT)
Dept: GENERAL RADIOLOGY | Facility: HOSPITAL | Age: 54
Discharge: HOME | End: 2024-02-29
Payer: COMMERCIAL

## 2024-02-29 DIAGNOSIS — M76.61 ACHILLES TENDINITIS, RIGHT LEG: ICD-10-CM

## 2024-02-29 DIAGNOSIS — M21.861 ACQUIRED POSTERIOR EQUINUS, RIGHT: ICD-10-CM

## 2024-02-29 DIAGNOSIS — M92.61 HAGLUND'S DEFORMITY, RIGHT: ICD-10-CM

## 2024-02-29 LAB
ANION GAP SERPL CALCULATED.3IONS-SCNC: 10.4 MMOL/L (ref 5–15)
BUN SERPL-MCNC: 19 MG/DL (ref 6–20)
BUN/CREAT SERPL: 29.7 (ref 7–25)
CALCIUM SPEC-SCNC: 9.2 MG/DL (ref 8.6–10.5)
CHLORIDE SERPL-SCNC: 105 MMOL/L (ref 98–107)
CO2 SERPL-SCNC: 28.6 MMOL/L (ref 22–29)
CREAT SERPL-MCNC: 0.64 MG/DL (ref 0.57–1)
DEPRECATED RDW RBC AUTO: 40 FL (ref 37–54)
EGFRCR SERPLBLD CKD-EPI 2021: 105.8 ML/MIN/1.73
ERYTHROCYTE [DISTWIDTH] IN BLOOD BY AUTOMATED COUNT: 13 % (ref 12.3–15.4)
GLUCOSE SERPL-MCNC: 83 MG/DL (ref 65–99)
HCT VFR BLD AUTO: 38.7 % (ref 34–46.6)
HGB BLD-MCNC: 12.5 G/DL (ref 12–15.9)
MCH RBC QN AUTO: 27.4 PG (ref 26.6–33)
MCHC RBC AUTO-ENTMCNC: 32.3 G/DL (ref 31.5–35.7)
MCV RBC AUTO: 84.9 FL (ref 79–97)
PLATELET # BLD AUTO: 284 10*3/MM3 (ref 140–450)
PMV BLD AUTO: 10.2 FL (ref 6–12)
POTASSIUM SERPL-SCNC: 3.9 MMOL/L (ref 3.5–5.2)
RBC # BLD AUTO: 4.56 10*6/MM3 (ref 3.77–5.28)
SODIUM SERPL-SCNC: 144 MMOL/L (ref 136–145)
WBC NRBC COR # BLD AUTO: 6.63 10*3/MM3 (ref 3.4–10.8)

## 2024-02-29 PROCEDURE — 93005 ELECTROCARDIOGRAM TRACING: CPT | Performed by: PODIATRIST

## 2024-02-29 PROCEDURE — 85027 COMPLETE CBC AUTOMATED: CPT

## 2024-02-29 PROCEDURE — 80048 BASIC METABOLIC PNL TOTAL CA: CPT

## 2024-02-29 PROCEDURE — 71046 X-RAY EXAM CHEST 2 VIEWS: CPT

## 2024-02-29 PROCEDURE — 36415 COLL VENOUS BLD VENIPUNCTURE: CPT

## 2024-03-01 LAB
QT INTERVAL: 466 MS
QTC INTERVAL: 438 MS

## 2024-03-08 ENCOUNTER — ANESTHESIA (OUTPATIENT)
Dept: PERIOP | Facility: HOSPITAL | Age: 54
End: 2024-03-08
Payer: COMMERCIAL

## 2024-03-08 ENCOUNTER — ANESTHESIA EVENT (OUTPATIENT)
Dept: PERIOP | Facility: HOSPITAL | Age: 54
End: 2024-03-08
Payer: COMMERCIAL

## 2024-03-08 ENCOUNTER — HOSPITAL ENCOUNTER (OUTPATIENT)
Facility: HOSPITAL | Age: 54
Setting detail: HOSPITAL OUTPATIENT SURGERY
Discharge: HOME | End: 2024-03-08
Attending: PODIATRIST | Admitting: PODIATRIST
Payer: COMMERCIAL

## 2024-03-08 VITALS
OXYGEN SATURATION: 94 % | SYSTOLIC BLOOD PRESSURE: 122 MMHG | DIASTOLIC BLOOD PRESSURE: 78 MMHG | WEIGHT: 286 LBS | HEIGHT: 68 IN | TEMPERATURE: 97.8 F | RESPIRATION RATE: 15 BRPM | HEART RATE: 50 BPM | BODY MASS INDEX: 43.35 KG/M2

## 2024-03-08 DIAGNOSIS — M76.61 ACHILLES TENDINITIS, RIGHT LEG: ICD-10-CM

## 2024-03-08 DIAGNOSIS — M21.861 ACQUIRED POSTERIOR EQUINUS, RIGHT: ICD-10-CM

## 2024-03-08 DIAGNOSIS — M92.61 HAGLUND'S DEFORMITY, RIGHT: ICD-10-CM

## 2024-03-08 PROCEDURE — 25010000002 PROPOFOL 1000 MG/100ML EMULSION: Performed by: NURSE ANESTHETIST, CERTIFIED REGISTERED

## 2024-03-08 PROCEDURE — 25010000002 FENTANYL CITRATE (PF) 100 MCG/2ML SOLUTION: Performed by: NURSE ANESTHETIST, CERTIFIED REGISTERED

## 2024-03-08 PROCEDURE — 25010000002 PROPOFOL 200 MG/20ML EMULSION: Performed by: NURSE ANESTHETIST, CERTIFIED REGISTERED

## 2024-03-08 PROCEDURE — 25010000002 DEXAMETHASONE PER 1 MG: Performed by: NURSE ANESTHETIST, CERTIFIED REGISTERED

## 2024-03-08 PROCEDURE — 28120 PART REMOVAL OF ANKLE/HEEL: CPT | Performed by: PODIATRIST

## 2024-03-08 PROCEDURE — 27687 REVISION OF CALF TENDON: CPT | Performed by: PODIATRIST

## 2024-03-08 PROCEDURE — 25010000002 SUCCINYLCHOLINE PER 20 MG: Performed by: NURSE ANESTHETIST, CERTIFIED REGISTERED

## 2024-03-08 PROCEDURE — 25010000002 ONDANSETRON PER 1 MG: Performed by: NURSE ANESTHETIST, CERTIFIED REGISTERED

## 2024-03-08 PROCEDURE — 25010000002 FENTANYL CITRATE (PF) 50 MCG/ML SOLUTION: Performed by: ANESTHESIOLOGY

## 2024-03-08 PROCEDURE — 25010000002 BUPIVACAINE (PF) 0.5 % SOLUTION 10 ML VIAL: Performed by: PODIATRIST

## 2024-03-08 PROCEDURE — 25010000002 SUGAMMADEX 200 MG/2ML SOLUTION: Performed by: NURSE ANESTHETIST, CERTIFIED REGISTERED

## 2024-03-08 PROCEDURE — 25010000002 LIDOCAINE 1 % SOLUTION 20 ML VIAL: Performed by: PODIATRIST

## 2024-03-08 PROCEDURE — 25010000002 HYDROMORPHONE 1 MG/ML SOLUTION: Performed by: ANESTHESIOLOGY

## 2024-03-08 PROCEDURE — 25010000002 PROCHLORPERAZINE 10 MG/2ML SOLUTION: Performed by: ANESTHESIOLOGY

## 2024-03-08 PROCEDURE — 25010000002 CEFAZOLIN 3 G RECONSTITUTED SOLUTION 1 EACH VIAL: Performed by: PODIATRIST

## 2024-03-08 PROCEDURE — 25810000003 LACTATED RINGERS PER 1000 ML: Performed by: PODIATRIST

## 2024-03-08 PROCEDURE — L4361 PNEUMA/VAC WALK BOOT PRE OTS: HCPCS | Performed by: PODIATRIST

## 2024-03-08 PROCEDURE — 25010000002 HYDROMORPHONE 1 MG/ML SOLUTION: Performed by: NURSE ANESTHETIST, CERTIFIED REGISTERED

## 2024-03-08 RX ORDER — IPRATROPIUM BROMIDE AND ALBUTEROL SULFATE 2.5; .5 MG/3ML; MG/3ML
3 SOLUTION RESPIRATORY (INHALATION) ONCE AS NEEDED
Status: DISCONTINUED | OUTPATIENT
Start: 2024-03-08 | End: 2024-03-08 | Stop reason: HOSPADM

## 2024-03-08 RX ORDER — HYDRALAZINE HYDROCHLORIDE 20 MG/ML
5 INJECTION INTRAMUSCULAR; INTRAVENOUS
Status: DISCONTINUED | OUTPATIENT
Start: 2024-03-08 | End: 2024-03-08 | Stop reason: HOSPADM

## 2024-03-08 RX ORDER — LIDOCAINE HYDROCHLORIDE 40 MG/ML
SOLUTION TOPICAL AS NEEDED
Status: DISCONTINUED | OUTPATIENT
Start: 2024-03-08 | End: 2024-03-08 | Stop reason: SURG

## 2024-03-08 RX ORDER — PROPOFOL 10 MG/ML
INJECTION, EMULSION INTRAVENOUS CONTINUOUS PRN
Status: DISCONTINUED | OUTPATIENT
Start: 2024-03-08 | End: 2024-03-08 | Stop reason: SURG

## 2024-03-08 RX ORDER — SUCCINYLCHOLINE CHLORIDE 20 MG/ML
INJECTION INTRAMUSCULAR; INTRAVENOUS AS NEEDED
Status: DISCONTINUED | OUTPATIENT
Start: 2024-03-08 | End: 2024-03-08 | Stop reason: SURG

## 2024-03-08 RX ORDER — LABETALOL HYDROCHLORIDE 5 MG/ML
5 INJECTION, SOLUTION INTRAVENOUS
Status: DISCONTINUED | OUTPATIENT
Start: 2024-03-08 | End: 2024-03-08 | Stop reason: HOSPADM

## 2024-03-08 RX ORDER — ONDANSETRON 2 MG/ML
INJECTION INTRAMUSCULAR; INTRAVENOUS AS NEEDED
Status: DISCONTINUED | OUTPATIENT
Start: 2024-03-08 | End: 2024-03-08 | Stop reason: SURG

## 2024-03-08 RX ORDER — DEXAMETHASONE SODIUM PHOSPHATE 4 MG/ML
INJECTION, SOLUTION INTRA-ARTICULAR; INTRALESIONAL; INTRAMUSCULAR; INTRAVENOUS; SOFT TISSUE AS NEEDED
Status: DISCONTINUED | OUTPATIENT
Start: 2024-03-08 | End: 2024-03-08 | Stop reason: SURG

## 2024-03-08 RX ORDER — FENTANYL CITRATE 50 UG/ML
50 INJECTION, SOLUTION INTRAMUSCULAR; INTRAVENOUS
Status: DISCONTINUED | OUTPATIENT
Start: 2024-03-08 | End: 2024-03-08 | Stop reason: HOSPADM

## 2024-03-08 RX ORDER — ROCURONIUM BROMIDE 10 MG/ML
INJECTION, SOLUTION INTRAVENOUS AS NEEDED
Status: DISCONTINUED | OUTPATIENT
Start: 2024-03-08 | End: 2024-03-08 | Stop reason: SURG

## 2024-03-08 RX ORDER — PROCHLORPERAZINE EDISYLATE 5 MG/ML
10 INJECTION INTRAMUSCULAR; INTRAVENOUS ONCE AS NEEDED
Status: COMPLETED | OUTPATIENT
Start: 2024-03-08 | End: 2024-03-08

## 2024-03-08 RX ORDER — EPHEDRINE SULFATE 5 MG/ML
5 INJECTION INTRAVENOUS ONCE AS NEEDED
Status: DISCONTINUED | OUTPATIENT
Start: 2024-03-08 | End: 2024-03-08 | Stop reason: HOSPADM

## 2024-03-08 RX ORDER — LIDOCAINE HYDROCHLORIDE 10 MG/ML
INJECTION, SOLUTION EPIDURAL; INFILTRATION; INTRACAUDAL; PERINEURAL AS NEEDED
Status: DISCONTINUED | OUTPATIENT
Start: 2024-03-08 | End: 2024-03-08 | Stop reason: SURG

## 2024-03-08 RX ORDER — DIPHENHYDRAMINE HYDROCHLORIDE 50 MG/ML
12.5 INJECTION INTRAMUSCULAR; INTRAVENOUS
Status: DISCONTINUED | OUTPATIENT
Start: 2024-03-08 | End: 2024-03-08 | Stop reason: HOSPADM

## 2024-03-08 RX ORDER — HYDROCODONE BITARTRATE AND ACETAMINOPHEN 7.5; 325 MG/1; MG/1
1 TABLET ORAL EVERY 6 HOURS PRN
Qty: 28 TABLET | Refills: 0 | Status: SHIPPED | OUTPATIENT
Start: 2024-03-08

## 2024-03-08 RX ORDER — FENTANYL CITRATE 50 UG/ML
INJECTION, SOLUTION INTRAMUSCULAR; INTRAVENOUS AS NEEDED
Status: DISCONTINUED | OUTPATIENT
Start: 2024-03-08 | End: 2024-03-08 | Stop reason: SURG

## 2024-03-08 RX ORDER — SODIUM CHLORIDE, SODIUM LACTATE, POTASSIUM CHLORIDE, CALCIUM CHLORIDE 600; 310; 30; 20 MG/100ML; MG/100ML; MG/100ML; MG/100ML
1000 INJECTION, SOLUTION INTRAVENOUS CONTINUOUS
Status: DISCONTINUED | OUTPATIENT
Start: 2024-03-08 | End: 2024-03-08 | Stop reason: HOSPADM

## 2024-03-08 RX ORDER — ONDANSETRON 2 MG/ML
4 INJECTION INTRAMUSCULAR; INTRAVENOUS ONCE AS NEEDED
Status: COMPLETED | OUTPATIENT
Start: 2024-03-08 | End: 2024-03-08

## 2024-03-08 RX ORDER — SODIUM CHLORIDE 0.9 % (FLUSH) 0.9 %
10 SYRINGE (ML) INJECTION AS NEEDED
Status: DISCONTINUED | OUTPATIENT
Start: 2024-03-08 | End: 2024-03-08 | Stop reason: HOSPADM

## 2024-03-08 RX ORDER — LIDOCAINE HYDROCHLORIDE 10 MG/ML
0.5 INJECTION, SOLUTION INFILTRATION; PERINEURAL ONCE AS NEEDED
Status: DISCONTINUED | OUTPATIENT
Start: 2024-03-08 | End: 2024-03-08 | Stop reason: HOSPADM

## 2024-03-08 RX ORDER — PROPOFOL 10 MG/ML
INJECTION, EMULSION INTRAVENOUS AS NEEDED
Status: DISCONTINUED | OUTPATIENT
Start: 2024-03-08 | End: 2024-03-08 | Stop reason: SURG

## 2024-03-08 RX ORDER — OXYCODONE HYDROCHLORIDE 5 MG/1
5 TABLET ORAL ONCE AS NEEDED
Status: DISCONTINUED | OUTPATIENT
Start: 2024-03-08 | End: 2024-03-08 | Stop reason: HOSPADM

## 2024-03-08 RX ADMIN — PROCHLORPERAZINE EDISYLATE 10 MG: 5 INJECTION INTRAMUSCULAR; INTRAVENOUS at 10:38

## 2024-03-08 RX ADMIN — ONDANSETRON 4 MG: 2 INJECTION INTRAMUSCULAR; INTRAVENOUS at 10:19

## 2024-03-08 RX ADMIN — ROCURONIUM BROMIDE 35 MG: 10 INJECTION, SOLUTION INTRAVENOUS at 09:07

## 2024-03-08 RX ADMIN — DEXAMETHASONE SODIUM PHOSPHATE 4 MG: 4 INJECTION, SOLUTION INTRAMUSCULAR; INTRAVENOUS at 09:32

## 2024-03-08 RX ADMIN — SODIUM CHLORIDE, POTASSIUM CHLORIDE, SODIUM LACTATE AND CALCIUM CHLORIDE 1000 ML: 600; 310; 30; 20 INJECTION, SOLUTION INTRAVENOUS at 07:10

## 2024-03-08 RX ADMIN — FENTANYL CITRATE 100 MCG: 50 INJECTION, SOLUTION INTRAMUSCULAR; INTRAVENOUS at 08:58

## 2024-03-08 RX ADMIN — FENTANYL CITRATE 50 MCG: 50 INJECTION, SOLUTION INTRAMUSCULAR; INTRAVENOUS at 10:19

## 2024-03-08 RX ADMIN — SUGAMMADEX 200 MG: 100 INJECTION, SOLUTION INTRAVENOUS at 09:42

## 2024-03-08 RX ADMIN — ONDANSETRON 4 MG: 2 INJECTION INTRAMUSCULAR; INTRAVENOUS at 09:32

## 2024-03-08 RX ADMIN — HYDROMORPHONE HYDROCHLORIDE 0.5 MG: 1 INJECTION, SOLUTION INTRAMUSCULAR; INTRAVENOUS; SUBCUTANEOUS at 09:32

## 2024-03-08 RX ADMIN — SUCCINYLCHOLINE CHLORIDE 140 MG: 20 INJECTION, SOLUTION INTRAMUSCULAR; INTRAVENOUS at 08:58

## 2024-03-08 RX ADMIN — SODIUM CHLORIDE 3000 MG: 900 INJECTION INTRAVENOUS at 09:09

## 2024-03-08 RX ADMIN — PROPOFOL 200 MG: 10 INJECTION, EMULSION INTRAVENOUS at 08:58

## 2024-03-08 RX ADMIN — ROCURONIUM BROMIDE 5 MG: 10 INJECTION, SOLUTION INTRAVENOUS at 08:58

## 2024-03-08 RX ADMIN — PROPOFOL INJECTABLE EMULSION 150 MCG/KG/MIN: 10 INJECTION, EMULSION INTRAVENOUS at 08:58

## 2024-03-08 RX ADMIN — LIDOCAINE HYDROCHLORIDE 20 MG: 10 INJECTION, SOLUTION EPIDURAL; INFILTRATION; INTRACAUDAL; PERINEURAL at 08:58

## 2024-03-08 RX ADMIN — LIDOCAINE HYDROCHLORIDE 1 EACH: 40 SOLUTION TOPICAL at 08:59

## 2024-03-08 RX ADMIN — HYDROMORPHONE HYDROCHLORIDE 0.5 MG: 1 INJECTION, SOLUTION INTRAMUSCULAR; INTRAVENOUS; SUBCUTANEOUS at 10:38

## 2024-03-08 NOTE — ANESTHESIA POSTPROCEDURE EVALUATION
Patient: Alfreda Mcgowan    Procedure Summary       Date: 03/08/24 Room / Location: [facility] OR 06 / [facility] MAIN OR    Anesthesia Start: 0851 Anesthesia Stop: 0955    Procedures:       RECESSION GASTROCNEMIUS (30 MIN TOTAL) (Right)      ROMI REMOVAL (Right: Foot) Diagnosis:       Achilles tendinitis, right leg      Romi's deformity, right      Acquired posterior equinus, right      (Achilles tendinitis, right leg [M76.61])      (Romi's deformity, right [M92.61])      (Acquired posterior equinus, right [M21.861])    Surgeons: MARY Emanuel DPM Provider: Mable Araiza MD    Anesthesia Type: general ASA Status: 3            Anesthesia Type: general    Vitals  Vitals Value Taken Time   /72 03/08/24 1050   Temp 97.1 °F (36.2 °C) 03/08/24 1048   Pulse 50 03/08/24 1052   Resp 13 03/08/24 1048   SpO2 93 % 03/08/24 1052   Vitals shown include unfiled device data.        Post Anesthesia Care and Evaluation    Patient location during evaluation: PACU  Patient participation: complete - patient participated  Level of consciousness: awake and alert  Pain management: satisfactory to patient    Airway patency: patent  Anesthetic complications: No anesthetic complications  PONV Status: none  Cardiovascular status: acceptable  Respiratory status: acceptable  Hydration status: acceptable

## 2024-03-08 NOTE — OP NOTE
Operative Note   Foot and Ankle Surgery   Provider: Dr. Donald Emanuel   Location: [facility]        Procedure:  1.  Gastrocnemius recession, right lower extremity  2.  Partial calcaneal exostectomy, right     Pre-operative Diagnosis:   1.  Chronic right heel pain  2.  Acquired equinus contracture, right lower extremity  3.  Romi's deformity, right     Post-operative Diagnosis: Same     Surgeon: Donald Emanuel     Assistant: Stuart Simon PGY 3     Anesthesia: General     Implants: None     Findings: No unexpected findings     Specimen: None     Blood Loss: Less than 5cc     Complications: None     Post Op Plan: Discharge home.  Partial weightbearing activity in cam boot.  Follow-up with me in 2 weeks     Summary:     Patient is a 53-year-old female that has been seen in office for chronic pain involving the right lower extremity.  She has had constant pain involving the right heel and failed multiple conservative treatments.  I did review further treatment options in office including gastrocnemius recession with Romi's removal.  I do feel that this will be an adequate procedure to manage patient's symptoms.  She understands and agrees and would like to proceed.  We did discuss risk of continued pain that may require additional surgery.     Procedure, risks, complications, and goals were discussed with the patient at bedside.  Risks include but are not limited to infection, complications from anesthesia (including death), chronic pain or numbness, hematoma/seroma, deep vein thrombosis, wound complications, and potential for additional surgical procedures.  Patient understands and elects to proceed with surgery at this time. Informed consent was obtained before proceeding to the operating suite.  All questions were answered to the patient's satisfaction. No guarantees or assurances were given or implied.     Procedure:     Patient was brought to the operating room and placed under general anesthesia on  the gurney.  Once adequate general anesthesia was obtained, a pneumatic tourniquet was placed about the patient's operative limb. Patient was then transitioned to the operating room table in a prone position ensuring to offload all bony prominences and appendages.  The operative limb was scrubbed prepped and draped in usual sterile fashion.  A formal timeout was conducted prior skin incision.  The operative limb was elevated and extenuated and the pneumatic tourniquet was inflated to 300 mmHg.     Attention was directed to the exterior calf region at the myotendinous junction. A linear incision was performed. Blunt dissection was performed to the deep fascia. The lesser saphenous vein and sural nerve were retracted. A stab incision to the deep fascia was performed. The gastrocnemius aponeurosis was identified and transected from a medial to lateral orientation without complication.  Care was taken to preserve the posterior deep fascia and sural nerve during the transection. A significant release to the equinus contracture was noted on the table. The incision was irrigated with normal saline and closed with a 2-0 vicryl to the deep fascia, 3-0 vicryl to the subcutaneous tissues and skin staples to the skin.       Attention was then directed to the lateral aspect of the calcaneus.  A linear longitudinal incision was performed at the level of the superior calcaneus just lateral to the Achilles tendon insertion.  Incision was performed through the skin to the subcutaneous tissue level.  A significant amount of fat consistent with Kager's triangle was identified.  The fatty tissue as well as the retrocalcaneal bursa was excised.  The Romi's deformity was identified and resected utilizing a rotary bur as well as a rongeur.  All rough edges were smoothed with a rasp.  The area was adequately decompressed.  The wound was irrigated with copious amounts of normal saline.  The subcutaneous tissues were closed with a 4-0  Vicryl and the skin repaired with 3-0 nylon.     The incision sites were dressed with Xeroform and sterile compressive dressings.  The tourniquet was released and a prompt hyperemic response was noted to all digits of the operative limb.  Patient tolerated the procedure and anesthesia well.  Patient was transferred from the operating room to the recovery room with vital signs stable and neurovascular status unchanged to the operative lower extremity.         Dr. Donald Emanuel, MEGHAN  [facility]  [phone]     Note is dictated utilizing voice recognition software. Unfortunately this leads to occasional typographical errors. I apologize in advance if the situation occurs. If questions occur please do not hesitate to call our office.

## 2024-03-08 NOTE — ANESTHESIA PROCEDURE NOTES
Airway  Urgency: elective    Date/Time: 3/8/2024 8:59 AM  Airway not difficult    General Information and Staff    Patient location during procedure: OR  Anesthesiologist: Mable Araiza MD    Indications and Patient Condition  Indications for airway management: airway protection    Preoxygenated: yes  Mask difficulty assessment: 1 - vent by mask    Final Airway Details  Final airway type: endotracheal airway      Successful airway: ETT  Cuffed: yes   Successful intubation technique: video laryngoscopy  Facilitating devices/methods: intubating stylet and cricoid pressure  Endotracheal tube insertion site: oral  Blade: Robertson  Blade size: 3  ETT size (mm): 7.0  Cormack-Lehane Classification: grade I - full view of glottis  Placement verified by: capnometry   Measured from: lips  ETT/EBT  to lips (cm): 22  Number of attempts at approach: 1  Assessment: lips, teeth, and gum same as pre-op and atraumatic intubation

## 2024-03-08 NOTE — ANESTHESIA PREPROCEDURE EVALUATION
Anesthesia Evaluation     Patient summary reviewed and Nursing notes reviewed   history of anesthetic complications:  PONV  NPO Solid Status: > 8 hours  NPO Liquid Status: > 8 hours           Airway   Mallampati: II  TM distance: >3 FB  Neck ROM: full  Dental - normal exam     Pulmonary - normal exam   (-) not a smoker  Cardiovascular - normal exam    (+) hypertension, hyperlipidemia  (-) angina      Neuro/Psych  GI/Hepatic/Renal/Endo    (+) obesity, morbid obesity, thyroid problem hypothyroidism    Musculoskeletal (-) negative ROS    Abdominal    Substance History - negative use     OB/GYN          Other - negative ROS                     Anesthesia Plan    ASA 3     general     intravenous induction     Anesthetic plan, risks, benefits, and alternatives have been provided, discussed and informed consent has been obtained with: patient.    Plan discussed with CRNA and CAA.    CODE STATUS:

## 2024-03-13 ENCOUNTER — TELEPHONE (OUTPATIENT)
Dept: PODIATRY | Facility: CLINIC | Age: 54
End: 2024-03-13
Payer: COMMERCIAL

## 2024-03-13 NOTE — TELEPHONE ENCOUNTER
I explained that she can take ace wrap off and pull white bandage up and reapply ace. Gentle ROM for exercise

## 2024-03-13 NOTE — TELEPHONE ENCOUNTER
Caller: PATIENT    Relationship to patient: SELF    Best call back number: [phone]    Patient is needing: PATIENT WAS CALLING TO DISCUSS A FEW CLINICAL QUESTIONS THAT SHE HAS. PATIENT HAD SURGERY WITH DR. WADDELL ON 03.08.24. PATIENT STATED SHE HAS NOTICED HER WRAP AND DRESSING HAS SHIFTED DOWN AND WOULD LIKE TO DISCUSS GETTING THAT ISSUE FIXED. PATIENT WANTED TO CLARIFY HOW SHE NEEDED TO DO HER ANKLE EXERCISES WHILE AT HOME. SENDING TELEPHONE ENCOUNTER DUE TO STAFF HAVING A STAFF MEETING. THANK YOU!

## 2024-03-25 ENCOUNTER — OFFICE VISIT (OUTPATIENT)
Dept: PODIATRY | Facility: CLINIC | Age: 54
End: 2024-03-25
Payer: COMMERCIAL

## 2024-03-25 VITALS — RESPIRATION RATE: 20 BRPM | WEIGHT: 286 LBS | HEIGHT: 68 IN | BODY MASS INDEX: 43.35 KG/M2

## 2024-03-25 DIAGNOSIS — M76.61 ACHILLES TENDINITIS, RIGHT LEG: ICD-10-CM

## 2024-03-25 DIAGNOSIS — M92.61 HAGLUND'S DEFORMITY, RIGHT: ICD-10-CM

## 2024-03-25 DIAGNOSIS — M21.861 ACQUIRED POSTERIOR EQUINUS, RIGHT: ICD-10-CM

## 2024-03-25 DIAGNOSIS — M25.571 ACUTE RIGHT ANKLE PAIN: Primary | ICD-10-CM

## 2024-03-25 PROCEDURE — 99024 POSTOP FOLLOW-UP VISIT: CPT | Performed by: PODIATRIST

## 2024-03-25 NOTE — PROGRESS NOTES
03/25/2024  Foot and Ankle Surgery - Established Patient/Follow-up  Provider: Dr. Donald Emanuel DPM  Location: [facility]    Subjective:  Alfreda Mcgowan is a 53 y.o. female.     Chief Complaint   Patient presents with    Right Foot - Post-op     3/8/2024 Gastrocnemius recession, right lower extremity  2.  Partial calcaneal exostectomy, right      Post-op     PALMER LUNA MD 10/15/2023       HPI:     Patient is a 53-year-old female who returns 2 weeks after right gastroc recession and partial calcaneal removal.    She felt a pop in her calf last night. Her pain was not too bad prior to yesterday 03/24/2024, and she has not taken the pain medication. She has been rotating a lot and flexing up and down. She has taken the boot off and walked to the bathroom carefully, got water or coffee, and went back to her chair, and she does not have the pain. She has not done any driving. She is not working yet. 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    No Known Allergies    Current Outpatient Medications on File Prior to Visit   Medication Sig Dispense Refill    baclofen (LIORESAL) 10 MG tablet Take 1 tablet by mouth Daily As Needed for Muscle Spasms.      cetirizine (zyrTEC) 10 MG tablet Take 1 tablet by mouth Daily.      hydroCHLOROthiazide (HYDRODIURIL) 25 MG tablet Take 1 tablet by mouth Daily.      levothyroxine (SYNTHROID, LEVOTHROID) 125 MCG tablet Take 1 tablet by mouth Daily.      LORazepam (ATIVAN) 0.5 MG tablet Take 1 tablet by mouth Every 8 (Eight) Hours As Needed for Anxiety.      rosuvastatin (CRESTOR) 5 MG tablet Take 1 tablet by mouth Every Night.      sertraline (ZOLOFT) 100 MG tablet Take 1 tablet by mouth Every Night.      HYDROcodone-acetaminophen (NORCO) 7.5-325 MG per tablet Take 1 tablet by mouth Every 6 (Six) Hours As Needed for Moderate Pain (Pain). (Patient not taking: Reported on 3/25/2024) 28 tablet 0  "    No current facility-administered medications on file prior to visit.       Objective   Resp 20   Ht 172.7 cm (68\")   Wt 130 kg (286 lb)   BMI 43.49 kg/m²     Foot/Ankle Exam    GENERAL  Appearance:  appears stated age and obese  Orientation:  AAOx3  Affect:  appropriate  Gait:  unimpaired  Assistance:  independent  Right shoe gear: casual shoe  Left shoe gear: casual shoe    VASCULAR     Right Foot Vascularity   Normal vascular exam    Dorsalis pedis:  2+  Posterior tibial:  2+  Skin temperature:  warm  Edema grading:  None  CFT:  < 3 seconds  Pedal hair growth:  Present  Varicosities:  none     Left Foot Vascularity   Normal vascular exam    Dorsalis pedis:  2+  Posterior tibial:  2+  Skin temperature:  warm  Edema grading:  None  CFT:  < 3 seconds  Pedal hair growth:  Present  Varicosities:  none     NEUROLOGIC     Right Foot Neurologic   Normal sensation    Light touch sensation: normal  Vibratory sensation: normal  Hot/Cold sensation: normal  Normal reflexes    Achilles reflex:  2+  Babinski reflex:  2+     Left Foot Neurologic   Normal sensation    Light touch sensation: normal  Vibratory sensation: normal  Hot/Cold sensation:  normal  Normal reflexes    Achilles reflex:  2+  Babinski reflex:  2+    MUSCULOSKELETAL     Right Foot Musculoskeletal   Ecchymosis:  none  Arch:  Normal     Left Foot Musculoskeletal   Ecchymosis:  none  Arch:  Normal    MUSCLE STRENGTH     Right Foot Muscle Strength   Normal strength    Foot dorsiflexion:  5  Foot plantar flexion:  5  Foot inversion:  5  Foot eversion:  5     Left Foot Muscle Strength   Normal strength    Foot dorsiflexion:  5  Foot plantar flexion:  5  Foot inversion:  5  Foot eversion:  5    RANGE OF MOTION     Right Foot Range of Motion   Foot and ankle ROM within normal limits       Left Foot Range of Motion   Foot and ankle ROM within normal limits      DERMATOLOGIC      Right Foot Dermatologic   Skin  Right foot skin is intact.      Left Foot Dermatologic "   Skin  Left foot skin is intact.      Right foot additional comments: Incision sites are dry and stable with intact sutures and staples. No evidence of dehiscence or infection. No significant pain or limitation. Minimal swelling to the retrocalcaneal region.      Assessment & Plan   Diagnoses and all orders for this visit:    1. Acute right ankle pain (Primary)    2. Achilles tendinitis, right leg    3. Romi's deformity, right    4. Acquired posterior equinus, right        Patient is doing well today.  Staples and sutures were removed today. She was advised to start massaging the area, stretching, and range of motion exercises at the knee and ankle. She was advised that it will take a long time for the strength to come back. She was advised to wear the boot for the next 2 weeks. She can do small steps around the house. She can drive when she feels up to it. She was recommended to get a compression stocking to help with the swelling. She can shower and bathe.  Patient is to follow-up in 4 weeks for reevaluation and imaging of the right foot    No orders of the defined types were placed in this encounter.       Follow-up  The patient will follow up in 4 weeks.    Note is dictated utilizing voice recognition software. Unfortunately this leads to occasional typographical errors. I apologize in advance if the situation occurs. If questions occur please do not hesitate to call our office.    Transcribed from ambient dictation for MARY Emanuel DPM by Colette Oneal.  03/25/24   09:34 EDT    Patient or patient representative verbalized consent to the visit recording.  I have personally performed the services described in this document as transcribed by the above individual, and it is both accurate and complete.

## 2024-04-16 ENCOUNTER — TREATMENT (OUTPATIENT)
Dept: PHYSICAL THERAPY | Facility: CLINIC | Age: 54
End: 2024-04-16
Payer: COMMERCIAL

## 2024-04-16 DIAGNOSIS — S86.001A INJURY OF RIGHT ACHILLES TENDON, INITIAL ENCOUNTER: ICD-10-CM

## 2024-04-16 DIAGNOSIS — M25.571 ACUTE RIGHT ANKLE PAIN: Primary | ICD-10-CM

## 2024-04-16 DIAGNOSIS — M92.61 HAGLUND'S DEFORMITY, RIGHT: ICD-10-CM

## 2024-04-16 DIAGNOSIS — M21.861 ACQUIRED POSTERIOR EQUINUS, RIGHT: ICD-10-CM

## 2024-04-16 DIAGNOSIS — M92.61 HAGLUND'S DEFORMITY OF RIGHT HEEL: ICD-10-CM

## 2024-04-16 DIAGNOSIS — M76.61 ACHILLES TENDINITIS, RIGHT LEG: ICD-10-CM

## 2024-04-16 PROCEDURE — 97140 MANUAL THERAPY 1/> REGIONS: CPT | Performed by: PHYSICAL THERAPIST

## 2024-04-16 PROCEDURE — 97161 PT EVAL LOW COMPLEX 20 MIN: CPT | Performed by: PHYSICAL THERAPIST

## 2024-04-16 PROCEDURE — 97110 THERAPEUTIC EXERCISES: CPT | Performed by: PHYSICAL THERAPIST

## 2024-04-16 NOTE — PROGRESS NOTES
Physical Therapy Initial Evaluation and Plan of Care    Patient: Alfreda Mcgowan   : 1970  Diagnosis/ICD-10 Code:  Acute right ankle pain [M25.571]  Referring practitioner: MARY Emanuel DPM  Outcome Measure:FAAM:, LEFS:    Diagnoses and all orders for this visit:    1. Acute right ankle pain (Primary)    2. Injury of right Achilles tendon, initial encounter    3. Romi's deformity of right heel    4. Acquired posterior equinus, right         Subjective Evaluation    History of Present Illness  Mechanism of injury: Pt reports to therapy with R ankle pain s/p R gastrocnemius recession, partial calcaneal exostectomy on 3/8/24.Pt reports that she had a hx of R ankle sprain that would hurt intermittently with gradual resolution with irritation in the December that did not resolve resulting in the sx. Pt was in a boot for 2 weeks and has since returned to work, she is able to sit for her job and prop her foot up, and has been having moderate to high levels of pain. No numbness or tingling. Pt reports a difficult time with walking, standing , balance, and ROM. Pt reports that she has a previous injury to her R big toe resulting in decreased ROM and poor balance      Aggravating factors: prolonged standing, walking, SLS    Alleviating factors: rest          Patient Occupation: bank teller Quality of life: good    Pain  Current pain ratin  At best pain rating: 3  At worst pain ratin    Patient Goals  Patient goals for therapy: increased motion, decreased pain, increased strength, independence with ADLs/IADLs, return to sport/leisure activities, improved balance and decreased edema             Objective          Observations     Right Ankle/Foot   Positive for edema and incision.     Additional Ankle/Foot Observation Details  No signs of infection noted    Palpation     Right   Hypertonic in the anterior tibialis, lateral gastrocnemius, medial gastrocnemius, peroneus, plantaris, posterior tibialis  and soleus. Tenderness of the anterior tibialis, lateral gastrocnemius, medial gastrocnemius, peroneus, plantaris, posterior tibialis and soleus.     Neurological Testing     Sensation     Ankle/Foot   Left Ankle/Foot   Intact: light touch    Right Ankle/Foot   Intact: light touch     Active Range of Motion   Left Ankle/Foot   Dorsiflexion (ke): 8 degrees   Plantar flexion: 52 degrees   Inversion: 42 degrees   Eversion: 12 degrees     Right Ankle/Foot   Dorsiflexion (ke): 5 degrees   Plantar flexion: 47 degrees   Inversion: 40 degrees   Eversion: 8 degrees with pain    Joint Play   Left Ankle/Foot  Joints within functional limits are the fibular head, proximal tibiofibular joint, distal tibiofibular joint, talocrural joint, subtalar joint, midfoot and forefoot.     Right Ankle/Foot  Joints within functional limits are the fibular head, proximal tibiofibular joint, distal tibiofibular joint, talocrural joint, subtalar joint, midfoot and forefoot.     Strength/Myotome Testing     Left Ankle/Foot   Dorsiflexion: 5  Plantar flexion: 5  Inversion: 5  Eversion: 5  Great toe flexion: 5  Great toe extension: 5    Right Ankle/Foot   Dorsiflexion: 4+  Plantar flexion: 4+  Inversion: 4+  Eversion: 4+  Great toe flexion: 4+  Great toe extension: 4+    Swelling   Left Ankle/Foot   Metatarsal heads: 25 cm  Figure 8: 62 cm  Malleoli: 32 cm    Right Ankle/Foot   Metatarsal heads: 25 cm  Figure 8: 63 cm  Malleoli: 33 cm    Ambulation   Weight-Bearing Status   Assistive device used: none    Observational Gait   Gait: antalgic   Decreased walking speed and stride length.           Assessment & Plan       Assessment  Impairments: abnormal coordination, abnormal gait, abnormal muscle firing, abnormal muscle tone, abnormal or restricted ROM, activity intolerance, impaired balance, impaired physical strength, lacks appropriate home exercise program, pain with function, safety issue and weight-bearing intolerance   Functional limitations:  carrying objects, lifting, sleeping, walking, pulling, pushing, uncomfortable because of pain, standing and unable to perform repetitive tasks   Assessment details: Patient is a 53 y.o. year old female who presents to therapy with R ankle pain s/p R gastrocnemius recession, partial calcaneal exostectomy on 3/8/24.  she presents with significant impairments including decreased RLE strength, decreased RLE ROM, impaired standing, walking, and activity tolerance, impaired FAAM and LEFS score and pain. Impairments affect ADL/IADL's, functional activities, recreational activities, and community activities. Pt will benefit from skilled physical therapy to address impairments, decrease pain and restore function.  Prognosis: good    Goals  Plan Goals: STG: 3 weeks.   1.  Pt with be independent with HEP to improve ankle ROM and reduce swelling/edema.   2. Pt to improve her pain to <4/10 to be able to perform stairs, ambulation, and recreational activities with less difficulty.   3. Pt to demonstrate full/WFL (R) ankle AROM as compared to her (R) to be able to normalize gait mechanics and perform work related activities without difficulty.     LT weeks.   1. Pt to improve her gross (R) ankle/LE strength to 5/5 by discharge to improve (R) ankle stability and activity tolerance.   2. Pt to be able to ambulate throughout full work day with min to no reports of pain/discomfort.   3. Pt to improve her  FAAM and LEFS outcome measure to >80% function.   4. Pt to be able to return to all recreational activities without difficulty.       Plan  Therapy options: will be seen for skilled therapy services  Planned modality interventions: cryotherapy, high voltage pulsed current (pain management), high voltage pulsed current (dermal wound therapy), thermotherapy (hydrocollator packs), ultrasound, TENS and contrast bath immersion  Planned therapy interventions: abdominal trunk stabilization, ADL retraining, balance/weight-bearing  training, body mechanics training, compression, fine motor coordination training, flexibility, functional ROM exercises, gait training, home exercise program, IADL retraining, joint mobilization, therapeutic activities, stretching, strengthening, spinal/joint mobilization, soft tissue mobilization, neuromuscular re-education, motor coordination training, manual therapy, postural training, orthotic fitting/training and transfer training  Frequency: 2x week  Duration in weeks: 10  Treatment plan discussed with: patient      History # of Personal Factors and/or Comorbidities: LOW (0)  Examination of Body System(s): # of elements: LOW (1-2)  Clinical Presentation: STABLE   Clinical Decision Making: LOW     Timed:         Manual Therapy:    10     mins  96136;     Therapeutic Exercise:    15     mins  83700;     Neuromuscular Marty:        mins  77489;    Therapeutic Activity:          mins  52684;     Gait Training:           mins  26070;     Ultrasound:          mins  56315;    Ionto                                   mins   19679  Self Care                            mins   93943        Un-Timed:  Electrical Stimulation:         mins  63597 (MC );  Dry Needling          mins self-pay  Traction          mins 73362  Low Eval     30     Mins  05193  Mod Eval          Mins  85202  High Eval                            Mins  30143  Re-Eval                               mins  87904        Timed Treatment:   25   mins   Total Treatment:     55   mins  PT SIGNATURE: Nicolasa Spivey PT, DPT          DATE TREATMENT INITIATED: 4/16/2024    Initial Certification  Certification Period: 7/15/2024  I certify that the therapy services are furnished while this patient is under my care.  The services outlined above are required by this patient, and will be reviewed every 90 days.     PHYSICIAN: MARY Emanuel, MEGHAN      DATE:     Please sign and return via fax to [phone].. Thank you, [facility].

## 2024-04-19 ENCOUNTER — TREATMENT (OUTPATIENT)
Dept: PHYSICAL THERAPY | Facility: CLINIC | Age: 54
End: 2024-04-19
Payer: COMMERCIAL

## 2024-04-19 DIAGNOSIS — M25.571 ACUTE RIGHT ANKLE PAIN: Primary | ICD-10-CM

## 2024-04-19 DIAGNOSIS — S86.001A INJURY OF RIGHT ACHILLES TENDON, INITIAL ENCOUNTER: ICD-10-CM

## 2024-04-19 DIAGNOSIS — M21.861 ACQUIRED POSTERIOR EQUINUS, RIGHT: ICD-10-CM

## 2024-04-19 DIAGNOSIS — M92.61 HAGLUND'S DEFORMITY OF RIGHT HEEL: ICD-10-CM

## 2024-04-19 DIAGNOSIS — M92.61 HAGLUND'S DEFORMITY, RIGHT: ICD-10-CM

## 2024-04-19 DIAGNOSIS — M76.61 ACHILLES TENDINITIS, RIGHT LEG: ICD-10-CM

## 2024-04-19 NOTE — PROGRESS NOTES
Physical Therapy Daily Treatment Note    [address] [place] [address]    VISIT#: 2    Subjective   Alfreda Mcgowan reports that she did well following her previous session but had some increased soreness following increased time on her feet yesterday   Pain Rating (0/10): 5    Objective     See Exercise, Manual, and Modality Logs for complete treatment.     Patient Education: Progress of therapy and POC    Assessment/Plan  Pt started on the NUSTEP followed by IASTM/STM performed on R gastroc/soleus. Pt also progressed in low level ROM and strengthening exercises with pt performing standing balance at the end of the session with increased lateral postural sway as the time in stance increased    Plan  Progress per Plan of Care and Progress strengthening /stabilization /functional activity            Timed:         Manual Therapy:    15     mins  43174;     Therapeutic Exercise:    15     mins  05903;     Neuromuscular Marty:    10    mins  64755;    Therapeutic Activity:          mins  35357;     Gait Training:           mins  25056;     Ultrasound:          mins  32405;    Ionto                                   mins   73716  Self Care                            mins   38078    Un-Timed:  Electrical Stimulation:         mins  42615 ( );  Dry Needling          mins self-pay  Traction         mins 07257  Low Eval          Mins  74640  Mod Eval          Mins  17234  High Eval                            Mins  46189  Re-Eval                               mins  85534    Timed Treatment:   40  mins   Total Treatment:     40   mins    Nicolasa Spivey PT, DPT  Physical Therapist

## 2024-04-23 ENCOUNTER — OFFICE VISIT (OUTPATIENT)
Dept: PODIATRY | Facility: CLINIC | Age: 54
End: 2024-04-23
Payer: COMMERCIAL

## 2024-04-23 VITALS
OXYGEN SATURATION: 97 % | WEIGHT: 286 LBS | HEART RATE: 64 BPM | BODY MASS INDEX: 43.35 KG/M2 | RESPIRATION RATE: 20 BRPM | HEIGHT: 68 IN

## 2024-04-23 DIAGNOSIS — M77.31 CALCANEAL SPUR, RIGHT: ICD-10-CM

## 2024-04-23 DIAGNOSIS — E66.01 MORBID OBESITY WITH BMI OF 40.0-44.9, ADULT: ICD-10-CM

## 2024-04-23 DIAGNOSIS — M76.61 ACHILLES TENDINITIS, RIGHT LEG: ICD-10-CM

## 2024-04-23 DIAGNOSIS — M25.571 ACUTE RIGHT ANKLE PAIN: Primary | ICD-10-CM

## 2024-04-23 DIAGNOSIS — M92.61 HAGLUND'S DEFORMITY, RIGHT: ICD-10-CM

## 2024-04-23 DIAGNOSIS — M77.41 METATARSALGIA, RIGHT FOOT: ICD-10-CM

## 2024-04-23 DIAGNOSIS — M21.861 ACQUIRED POSTERIOR EQUINUS, RIGHT: ICD-10-CM

## 2024-04-23 PROCEDURE — 99024 POSTOP FOLLOW-UP VISIT: CPT | Performed by: PODIATRIST

## 2024-04-24 NOTE — PROGRESS NOTES
"04/23/2024  Foot and Ankle Surgery - Established Patient/Follow-up  Provider: Dr. Donald Emanuel, MEGHAN  Location: [facility]    Subjective:  Alfreda Mcgowan is a 53 y.o. female.     Chief Complaint   Patient presents with    Right Foot - Follow-up     Dr. Emanuel 3/8/2024 Gastrocnemius recession, right lower extremity  2.  Partial calcaneal exostectomy, right    Follow-up     PALMER Larios pcp last visit 10/15/2023       History of Present Illness  The patient presents for 6-week follow-up.    The patient reports a gradual improvement in his condition, albeit with persistent swelling. He negates the presence of significant pain, but notes an unusual popping sensation. He has been engaging in physical therapy, attending sessions bi-weekly for the past 4 weeks. On non-physical therapy days, he engages in evening walks within his residence.      No Known Allergies    Current Outpatient Medications on File Prior to Visit   Medication Sig Dispense Refill    baclofen (LIORESAL) 10 MG tablet Take 1 tablet by mouth Daily As Needed for Muscle Spasms.      cetirizine (zyrTEC) 10 MG tablet Take 1 tablet by mouth Daily.      hydroCHLOROthiazide (HYDRODIURIL) 25 MG tablet Take 1 tablet by mouth Daily.      levothyroxine (SYNTHROID, LEVOTHROID) 125 MCG tablet Take 1 tablet by mouth Daily.      LORazepam (ATIVAN) 0.5 MG tablet Take 1 tablet by mouth Every 8 (Eight) Hours As Needed for Anxiety.      rosuvastatin (CRESTOR) 5 MG tablet Take 1 tablet by mouth Every Night.      sertraline (ZOLOFT) 100 MG tablet Take 1 tablet by mouth Every Night.      HYDROcodone-acetaminophen (NORCO) 7.5-325 MG per tablet Take 1 tablet by mouth Every 6 (Six) Hours As Needed for Moderate Pain (Pain). (Patient not taking: Reported on 3/25/2024) 28 tablet 0     No current facility-administered medications on file prior to visit.       Objective   Pulse 64   Resp 20   Ht 172.7 cm (68\")   Wt 130 kg (286 lb)   SpO2 97%   BMI 43.49 kg/m²     Foot/Ankle " Exam  Physical Exam  GENERAL  Appearance:  appears stated age and obese  Orientation:  AAOx3  Affect:  appropriate  Gait:  unimpaired  Assistance:  independent  Right shoe gear: casual shoe  Left shoe gear: casual shoe     VASCULAR      Right Foot Vascularity   Normal vascular exam    Dorsalis pedis:  2+  Posterior tibial:  2+  Skin temperature:  warm  Edema grading:  None  CFT:  < 3 seconds  Pedal hair growth:  Present  Varicosities:  none      Left Foot Vascularity   Normal vascular exam    Dorsalis pedis:  2+  Posterior tibial:  2+  Skin temperature:  warm  Edema grading:  None  CFT:  < 3 seconds  Pedal hair growth:  Present  Varicosities:  none     NEUROLOGIC      Right Foot Neurologic   Normal sensation    Light touch sensation: normal  Vibratory sensation: normal  Hot/Cold sensation: normal  Normal reflexes    Achilles reflex:  2+  Babinski reflex:  2+      Left Foot Neurologic   Normal sensation    Light touch sensation: normal  Vibratory sensation: normal  Hot/Cold sensation:  normal  Normal reflexes    Achilles reflex:  2+  Babinski reflex:  2+     MUSCULOSKELETAL      Right Foot Musculoskeletal   Ecchymosis:  none  Arch:  Normal      Left Foot Musculoskeletal   Ecchymosis:  none  Arch:  Normal     MUSCLE STRENGTH      Right Foot Muscle Strength   Normal strength    Foot dorsiflexion:  5  Foot plantar flexion:  5  Foot inversion:  5  Foot eversion:  5      Left Foot Muscle Strength   Normal strength    Foot dorsiflexion:  5  Foot plantar flexion:  5  Foot inversion:  5  Foot eversion:  5     RANGE OF MOTION      Right Foot Range of Motion   Foot and ankle ROM within normal limits        Left Foot Range of Motion   Foot and ankle ROM within normal limits       DERMATOLOGIC       Right Foot Dermatologic   Skin  Right foot skin is intact.       Left Foot Dermatologic   Skin  Left foot skin is intact.      Right foot additional comments: Incision sites are dry and stable with intact sutures and staples. No  evidence of dehiscence or infection. No significant pain or limitation. Minimal swelling to the retrocalcaneal region.    4/23/24: No significant discomfort with palpation involving the posterior aspect of the heel.  Moderate tightness and discomfort involving the operative site.  No progressive deformity.  Strength is improving to the posterior aspect of the leg.  No significant calf pain      Results      Assessment & Plan   Diagnoses and all orders for this visit:    1. Acute right ankle pain (Primary)    2. Achilles tendinitis, right leg    3. Romi's deformity, right    4. Acquired posterior equinus, right    5. Metatarsalgia, right foot    6. Calcaneal spur, right    7. Morbid obesity with BMI of 40.0-44.9, adult      Assessment & Plan    The patient is advised to persist with manual therapy, massage, stretching, and low-impact exercises. He is also encouraged to gradually increase his activity. The popping sensation he is experiencing is likely a result of scar tissue. Reviewed proper basic stretching and manual therapy exercises along with appropriate shoes and activity.  Discussed proper use and/or avoidance of OTC anti-inflammatories.  Patient is to call with any additional issues or concerns.      Follow-up  The patient is scheduled for a follow-up visit in 2 months.             Patient or patient representative verbalized consent for the use of Ambient Listening during the visit with  MARY Emanuel DPM for chart documentation. 4/24/2024  06:36 EDT    MARY Emanuel DPM

## 2024-04-26 ENCOUNTER — TREATMENT (OUTPATIENT)
Dept: PHYSICAL THERAPY | Facility: CLINIC | Age: 54
End: 2024-04-26
Payer: COMMERCIAL

## 2024-04-26 DIAGNOSIS — M76.61 ACHILLES TENDINITIS, RIGHT LEG: ICD-10-CM

## 2024-04-26 DIAGNOSIS — M92.61 HAGLUND'S DEFORMITY OF RIGHT HEEL: ICD-10-CM

## 2024-04-26 DIAGNOSIS — M21.861 ACQUIRED POSTERIOR EQUINUS, RIGHT: ICD-10-CM

## 2024-04-26 DIAGNOSIS — S86.001A INJURY OF RIGHT ACHILLES TENDON, INITIAL ENCOUNTER: ICD-10-CM

## 2024-04-26 DIAGNOSIS — M92.61 HAGLUND'S DEFORMITY, RIGHT: ICD-10-CM

## 2024-04-26 DIAGNOSIS — M25.571 ACUTE RIGHT ANKLE PAIN: Primary | ICD-10-CM

## 2024-04-26 NOTE — PROGRESS NOTES
Physical Therapy Daily Treatment Note    [address] [place] [address]    VISIT#: 3    Subjective   Alfreda Mcgowan reports tht she is doing well with decreased irritation overall since her previous session  Pain Rating (0/10): 3    Objective     See Exercise, Manual, and Modality Logs for complete treatment.     Patient Education: Progress of therapy and POC    Assessment/Plan  Pt continues to progress ROM, strengthening, stability, and activity tolerance with a stitch removed from the gastroc incision    Plan  Progress per Plan of Care and Progress strengthening /stabilization /functional activity            Timed:         Manual Therapy:    15     mins  31720;     Therapeutic Exercise:    15     mins  00606;     Neuromuscular Marty:    15    mins  58046;    Therapeutic Activity:          mins  67567;     Gait Training:           mins  79116;     Ultrasound:          mins  94113;    Ionto                                   mins   44483  Self Care                            mins   28994    Un-Timed:  Electrical Stimulation:         mins  33389 ( );  Dry Needling          mins self-pay  Traction          mins 56711  Low Eval          Mins  60814  Mod Eval          Mins  89741  High Eval                            Mins  12315  Re-Eval                               mins  21100    Timed Treatment:   45   mins   Total Treatment:     45   mins    Nicolasa Spivey PT, DPT  Physical Therapist

## 2024-04-30 ENCOUNTER — TREATMENT (OUTPATIENT)
Dept: PHYSICAL THERAPY | Facility: CLINIC | Age: 54
End: 2024-04-30
Payer: COMMERCIAL

## 2024-04-30 DIAGNOSIS — M92.61 HAGLUND'S DEFORMITY, RIGHT: ICD-10-CM

## 2024-04-30 DIAGNOSIS — S86.001A INJURY OF RIGHT ACHILLES TENDON, INITIAL ENCOUNTER: ICD-10-CM

## 2024-04-30 DIAGNOSIS — M92.61 HAGLUND'S DEFORMITY OF RIGHT HEEL: ICD-10-CM

## 2024-04-30 DIAGNOSIS — M76.61 ACHILLES TENDINITIS, RIGHT LEG: ICD-10-CM

## 2024-04-30 DIAGNOSIS — M25.571 ACUTE RIGHT ANKLE PAIN: Primary | ICD-10-CM

## 2024-04-30 DIAGNOSIS — M21.861 ACQUIRED POSTERIOR EQUINUS, RIGHT: ICD-10-CM

## 2024-04-30 PROCEDURE — 97110 THERAPEUTIC EXERCISES: CPT | Performed by: PHYSICAL THERAPIST

## 2024-04-30 PROCEDURE — 97112 NEUROMUSCULAR REEDUCATION: CPT | Performed by: PHYSICAL THERAPIST

## 2024-04-30 PROCEDURE — 97140 MANUAL THERAPY 1/> REGIONS: CPT | Performed by: PHYSICAL THERAPIST

## 2024-04-30 NOTE — PROGRESS NOTES
Physical Therapy Daily Treatment Note    [address] [place] [address]    VISIT#: 4    Subjective   Alfreda Mcgowan reports that she did a greater amount of standing and walking over the weekend with a slight increase in irritation in the medial aspect of her heel with pt reporting a similar irritation to prior to sx  Pain Rating (0/10): 4    Objective     See Exercise, Manual, and Modality Logs for complete treatment.     Patient Education: Progress of therapy and POC    Assessment/Plan  Increased swelling and irritation noted today with decreased standing exercises performed to limit irritation with slight to no increase in pain throughout the session    Plan  Progress per Plan of Care and Progress strengthening /stabilization /functional activity            Timed:         Manual Therapy:    10     mins  07942;     Therapeutic Exercise:    15     mins  35936;     Neuromuscular Marty:    15    mins  88816;    Therapeutic Activity:          mins  06731;     Gait Training:           mins  68518;     Ultrasound:          mins  24037;    Ionto                                   mins   38557  Self Care                            mins   11245    Un-Timed:  Electrical Stimulation:         mins  86100 ( );  Dry Needling          mins self-pay  Traction          mins 28591  Low Eval          Mins  66572  Mod Eval          Mins  07338  High Eval                            Mins  83042  Re-Eval                               mins  56374    Timed Treatment:   40   mins   Total Treatment:     40   mins    Nicolasa Spivey PT, DPT  Physical Therapist

## 2024-05-07 ENCOUNTER — TREATMENT (OUTPATIENT)
Dept: PHYSICAL THERAPY | Facility: CLINIC | Age: 54
End: 2024-05-07
Payer: COMMERCIAL

## 2024-05-07 DIAGNOSIS — S86.001A INJURY OF RIGHT ACHILLES TENDON, INITIAL ENCOUNTER: ICD-10-CM

## 2024-05-07 DIAGNOSIS — M76.61 ACHILLES TENDINITIS, RIGHT LEG: ICD-10-CM

## 2024-05-07 DIAGNOSIS — M25.571 ACUTE RIGHT ANKLE PAIN: Primary | ICD-10-CM

## 2024-05-07 DIAGNOSIS — M92.61 HAGLUND'S DEFORMITY, RIGHT: ICD-10-CM

## 2024-05-07 DIAGNOSIS — M92.61 HAGLUND'S DEFORMITY OF RIGHT HEEL: ICD-10-CM

## 2024-05-07 DIAGNOSIS — M21.861 ACQUIRED POSTERIOR EQUINUS, RIGHT: ICD-10-CM

## 2024-05-07 PROCEDURE — 97140 MANUAL THERAPY 1/> REGIONS: CPT | Performed by: PHYSICAL THERAPIST

## 2024-05-07 PROCEDURE — 97112 NEUROMUSCULAR REEDUCATION: CPT | Performed by: PHYSICAL THERAPIST

## 2024-05-07 PROCEDURE — 97110 THERAPEUTIC EXERCISES: CPT | Performed by: PHYSICAL THERAPIST

## 2024-05-16 ENCOUNTER — TREATMENT (OUTPATIENT)
Dept: PHYSICAL THERAPY | Facility: CLINIC | Age: 54
End: 2024-05-16
Payer: COMMERCIAL

## 2024-05-16 DIAGNOSIS — M92.61 HAGLUND'S DEFORMITY OF RIGHT HEEL: ICD-10-CM

## 2024-05-16 DIAGNOSIS — M92.61 HAGLUND'S DEFORMITY, RIGHT: ICD-10-CM

## 2024-05-16 DIAGNOSIS — S86.001A INJURY OF RIGHT ACHILLES TENDON, INITIAL ENCOUNTER: ICD-10-CM

## 2024-05-16 DIAGNOSIS — M25.571 ACUTE RIGHT ANKLE PAIN: Primary | ICD-10-CM

## 2024-05-16 DIAGNOSIS — M76.61 ACHILLES TENDINITIS, RIGHT LEG: ICD-10-CM

## 2024-05-16 DIAGNOSIS — M21.861 ACQUIRED POSTERIOR EQUINUS, RIGHT: ICD-10-CM

## 2024-05-16 NOTE — PROGRESS NOTES
Re-Assessment / Progress Note    Patient: Alfreda Mcgowan   : 1970  Diagnosis/ICD-10 Code:  Acute right ankle pain [M25.571]  Referring practitioner: MARY Emanuel DPM  Date of Initial Visit: Episode Type: THERAPY  Noted: 2024    Today's Date: 2024  Patient seen for 6 sessions.      Subjective:   Alfreda Mcgowan reports: that she is having a greater amount of pain and swelling today which she partially relates to her heel cracking despite her performing her normal routine to decrease the incidents of this with altered WB at this time due to the spot on her heel.   Subjective Questionnaire: FAAM:  Clinical Progress: improved  Home Program Compliance: Yes  Treatment has included: therapeutic exercise, neuromuscular re-education, manual therapy, therapeutic activity, gait training, electrical stimulation, moist heat, and cryotherapy    Subjective   Objective   Observations     Right Ankle/Foot   Positive for edema and incision.     Additional Ankle/Foot Observation Details  No signs of infection noted    Palpation     Right   Hypertonic in the anterior tibialis, lateral gastrocnemius, medial gastrocnemius, peroneus, plantaris, posterior tibialis and soleus. Tenderness of the anterior tibialis, lateral gastrocnemius, medial gastrocnemius, peroneus, plantaris, posterior tibialis and soleus.     Neurological Testing     Sensation     Ankle/Foot   Left Ankle/Foot   Intact: light touch    Right Ankle/Foot   Intact: light touch     Active Range of Motion   Left Ankle/Foot   Dorsiflexion (ke): 8 degrees   Plantar flexion: 52 degrees   Inversion: 42 degrees   Eversion: 12 degrees     Right Ankle/Foot   Dorsiflexion (ke): 10 degrees   Plantar flexion: 60 degrees   Inversion: 42 degrees   Eversion: 16 degrees    Joint Play   Left Ankle/Foot  Joints within functional limits are the fibular head, proximal tibiofibular joint, distal tibiofibular joint, talocrural joint, subtalar joint, midfoot and forefoot.      Right Ankle/Foot  Joints within functional limits are the fibular head, proximal tibiofibular joint, distal tibiofibular joint, talocrural joint, subtalar joint, midfoot and forefoot.     Strength/Myotome Testing     Left Ankle/Foot   Dorsiflexion: 5  Plantar flexion: 5  Inversion: 5  Eversion: 5  Great toe flexion: 5  Great toe extension: 5    Right Ankle/Foot   Dorsiflexion: 5  Plantar flexion: 5  Inversion: 5  Eversion: 5  Great toe flexion: 5  Great toe extension:5    Swelling   Left Ankle/Foot   Metatarsal heads: 25 cm  Figure 8: 62 cm  Malleoli: 32 cm    Right Ankle/Foot   Metatarsal heads: 25 cm  Figure 8: 61 cm  Malleoli: 32 cm    Ambulation   Weight-Bearing Status   Assistive device used: none    Observational Gait   Gait: antalgic   Decreased walking speed and stride length.       Assessment/Plan  Assessment  Assessment details: Patient is a 53 y.o. year old female who presents to therapy with R ankle pain s/p R gastrocnemius recession, partial calcaneal exostectomy on 3/8/24.  At initial evaluation she presented with significant impairments including decreased RLE strength, decreased RLE ROM, impaired standing, walking, and activity tolerance, impaired FAAM and LEFS score and pain. Pt has demonstrated improvements in ROM, strengthening, stability, swelling, and FAAM score but continues to have limited standing and walking tolerance, and pain and swelling. Impairments affect ADL/IADL's, functional activities, recreational activities, and community activities. Pt will benefit from skilled physical therapy to address impairments, decrease pain and restore function.  Prognosis: good    Goals  Plan Goals: STG: 3 weeks.   1.  Pt with be independent with HEP to improve ankle ROM and reduce swelling/edema. - MET 5/16/24  2. Pt to improve her pain to <4/10 to be able to perform stairs, ambulation, and recreational activities with less difficulty. - MET 5/16/24  3. Pt to demonstrate full/WFL (R) ankle AROM as  compared to her (R) to be able to normalize gait mechanics and perform work related activities without difficulty. - MET 24    LT weeks.   1. Pt to improve her gross (R) ankle/LE strength to 5/5 by discharge to improve (R) ankle stability and activity tolerance. - MET 24  2. Pt to be able to ambulate throughout full work day with min to no reports of pain/discomfort. - NOT MET 24  3. Pt to improve her  FAAM and LEFS outcome measure to >80% function.  - NOT MET 24  4. Pt to be able to return to all recreational activities without difficulty.    - NOT MET 24    Progress toward previous goals: Partially Met  Recommendations: Continue as planned  Timeframe: 1 month 1-2x a week  Prognosis to achieve goals: good    PT Signature: Nicolasa Spivey PT, DPT            [place] Lic. # [id number]        Based upon review of the patient's progress and continued therapy plan, it is my medical opinion that Alfreda Mcgowan should continue physical therapy treatment at [facility] PH[facility]  [address] [facility] [address]  [place] [address] [phone].    Signature: __________________________________  MARY Emanuel DPM    Timed:         Manual Therapy:    10     mins  85703;     Therapeutic Exercise:    15     mins  58559;     Neuromuscular Marty:    15    mins  78072;    Therapeutic Activity:          mins  21312;     Gait Training:           mins  46933;     Ultrasound:          mins  96593;    Ionto                                   mins   20264  Self Care                            mins   91871        Un-Timed:  Electrical Stimulation:         mins  86791 ( );  Dry Needling          mins self-pay  Traction          mins 10783  Low Eval          Mins  63833  Mod Eval          Mins  19098  High Eval                            Mins  85283  Re-Eval                               mins  66654        Timed Treatment:   40   mins   Total Treatment:     40   mins

## 2024-05-22 ENCOUNTER — TREATMENT (OUTPATIENT)
Dept: PHYSICAL THERAPY | Facility: CLINIC | Age: 54
End: 2024-05-22
Payer: COMMERCIAL

## 2024-05-22 DIAGNOSIS — S86.001A INJURY OF RIGHT ACHILLES TENDON, INITIAL ENCOUNTER: ICD-10-CM

## 2024-05-22 DIAGNOSIS — M25.571 ACUTE RIGHT ANKLE PAIN: Primary | ICD-10-CM

## 2024-05-22 DIAGNOSIS — M92.61 HAGLUND'S DEFORMITY OF RIGHT HEEL: ICD-10-CM

## 2024-05-22 DIAGNOSIS — M21.861 ACQUIRED POSTERIOR EQUINUS, RIGHT: ICD-10-CM

## 2024-05-22 DIAGNOSIS — M92.61 HAGLUND'S DEFORMITY, RIGHT: ICD-10-CM

## 2024-05-22 DIAGNOSIS — M76.61 ACHILLES TENDINITIS, RIGHT LEG: ICD-10-CM

## 2024-05-22 PROCEDURE — 97112 NEUROMUSCULAR REEDUCATION: CPT | Performed by: PHYSICAL THERAPIST

## 2024-05-22 PROCEDURE — 97110 THERAPEUTIC EXERCISES: CPT | Performed by: PHYSICAL THERAPIST

## 2024-05-22 PROCEDURE — 97530 THERAPEUTIC ACTIVITIES: CPT | Performed by: PHYSICAL THERAPIST

## 2024-05-22 NOTE — PROGRESS NOTES
Physical Therapy Daily Treatment Note    [address] [place] [address]    VISIT#: 7    Subjective   Alfreda Mcgowan reports that she is continuing to have pain and irritation with little change from her previous session  Pain Rating (0/10): 5    Objective     See Exercise, Manual, and Modality Logs for complete treatment.     Patient Education: Progress of therapy and POC    Assessment/Plan  Pt continues to have irritation but demonstrated improvements in functional strength and stability with minimal rest breaks taken throughout the session demonstrating improvements in activity tolerance. CP applied with foot elevated for pain and swelling reduction with decreased swelling noted today as compared to her previous session.     Plan  Progress per Plan of Care and Progress strengthening /stabilization /functional activity            Timed:         Manual Therapy:         mins  37492;     Therapeutic Exercise:    10     mins  62904;     Neuromuscular Marty:    15    mins  78591;    Therapeutic Activity:     10     mins  28331;     Gait Training:           mins  66676;     Ultrasound:          mins  50893;    Ionto                                   mins   55078  Self Care                            mins   34870    Un-Timed:  Electrical Stimulation:         mins  70902 ( );  Dry Needling          mins self-pay  Traction          mins 14060  Low Eval          Mins  19320  Mod Eval          Mins  18616  High Eval                            Mins  72189  Re-Eval                               mins  19117    Timed Treatment:   35   mins   Total Treatment:     35   mins    Nicolasa Spivey PT, DPT  Physical Therapist

## 2024-06-04 ENCOUNTER — TREATMENT (OUTPATIENT)
Dept: PHYSICAL THERAPY | Facility: CLINIC | Age: 54
End: 2024-06-04
Payer: COMMERCIAL

## 2024-06-04 DIAGNOSIS — M76.61 ACHILLES TENDINITIS, RIGHT LEG: ICD-10-CM

## 2024-06-04 DIAGNOSIS — S86.001A INJURY OF RIGHT ACHILLES TENDON, INITIAL ENCOUNTER: ICD-10-CM

## 2024-06-04 DIAGNOSIS — M21.861 ACQUIRED POSTERIOR EQUINUS, RIGHT: ICD-10-CM

## 2024-06-04 DIAGNOSIS — M92.61 HAGLUND'S DEFORMITY, RIGHT: ICD-10-CM

## 2024-06-04 DIAGNOSIS — M25.571 ACUTE RIGHT ANKLE PAIN: Primary | ICD-10-CM

## 2024-06-04 DIAGNOSIS — M92.61 HAGLUND'S DEFORMITY OF RIGHT HEEL: ICD-10-CM

## 2024-06-04 PROCEDURE — 97112 NEUROMUSCULAR REEDUCATION: CPT | Performed by: PHYSICAL THERAPIST

## 2024-06-04 PROCEDURE — 97110 THERAPEUTIC EXERCISES: CPT | Performed by: PHYSICAL THERAPIST

## 2024-06-04 PROCEDURE — 97530 THERAPEUTIC ACTIVITIES: CPT | Performed by: PHYSICAL THERAPIST

## 2024-06-04 NOTE — PROGRESS NOTES
Physical Therapy Daily Treatment Note    [address] [place] [address]    VISIT#: 8    Subjective   Alfreda Mcgowan reports that she was sick last week and is doing much better today and feels like she is making steady progress in her RLE.   Pain Rating (0/10): 3    Objective     See Exercise, Manual, and Modality Logs for complete treatment.     Patient Education: Progress of therapy and POC    Assessment/Plan  Pt continues to progress strengthening, stability, ROM, proprioception and activity tolerance with greater amounts of higher level exercises performed today    Plan  Progress per Plan of Care and Progress strengthening /stabilization /functional activity            Timed:         Manual Therapy:         mins  50563;     Therapeutic Exercise:    15     mins  67490;     Neuromuscular Marty:    15    mins  89903;    Therapeutic Activity:     10     mins  35722;     Gait Training:           mins  85143;     Ultrasound:          mins  36936;    Ionto                                   mins   83130  Self Care                            mins   31153    Un-Timed:  Electrical Stimulation:         mins  56225 ( );  Dry Needling          mins self-pay  Traction          mins 74235  Low Eval          Mins  29171  Mod Eval          Mins  22031  High Eval                            Mins  52689  Re-Eval                               mins  88394    Timed Treatment:   40   mins   Total Treatment:     40   mins    Nicolasa Spivey PT, DPT  Physical Therapist

## 2024-06-11 ENCOUNTER — TREATMENT (OUTPATIENT)
Dept: PHYSICAL THERAPY | Facility: CLINIC | Age: 54
End: 2024-06-11
Payer: COMMERCIAL

## 2024-06-11 DIAGNOSIS — M21.861 ACQUIRED POSTERIOR EQUINUS, RIGHT: ICD-10-CM

## 2024-06-11 DIAGNOSIS — S86.001A INJURY OF RIGHT ACHILLES TENDON, INITIAL ENCOUNTER: ICD-10-CM

## 2024-06-11 DIAGNOSIS — M92.61 HAGLUND'S DEFORMITY, RIGHT: ICD-10-CM

## 2024-06-11 DIAGNOSIS — M76.61 ACHILLES TENDINITIS, RIGHT LEG: ICD-10-CM

## 2024-06-11 DIAGNOSIS — M25.571 ACUTE RIGHT ANKLE PAIN: Primary | ICD-10-CM

## 2024-06-11 DIAGNOSIS — M92.61 HAGLUND'S DEFORMITY OF RIGHT HEEL: ICD-10-CM

## 2024-06-11 NOTE — PROGRESS NOTES
Re-Assessment / Progress Note    Patient: Alfreda Mcgowan   : 1970  Diagnosis/ICD-10 Code:  Acute right ankle pain [M25.571]  Referring practitioner: AMRY Emanuel DPM  Date of Initial Visit: Episode Type: THERAPY  Noted: 2024    Today's Date: 2024  Patient seen for 9 sessions.      Subjective:   Alfreda Mcgowan reports: that her heel feels bruised and is feeling like it did prior to having her sx. Pt states that her pain occurs WB and NWB with swelling continued to be noted. No numbness or tingling noted Pain: Current: 3, Best:1, Worst:8  Subjective Questionnaire: FAAM: ADL:47/84  Clinical Progress: Pt has demonstrated improvements but is continuing to have pain at this time  Home Program Compliance: Yes  Treatment has included: therapeutic exercise, neuromuscular re-education, manual therapy, therapeutic activity, electrical stimulation, ultrasound, and cryotherapy    Subjective   Objective     Observations     Right Ankle/Foot   Positive for edema and incision. Pain with Pronation    Additional Ankle/Foot Observation Details  No signs of infection noted    Palpation     Right   Hypertonic in the anterior tibialis, lateral gastrocnemius, medial gastrocnemius, peroneus, plantaris, posterior tibialis and soleus. Tenderness of the anterior tibialis, lateral gastrocnemius, medial gastrocnemius, peroneus, plantaris, posterior tibialis and soleus.     Neurological Testing     Sensation     Ankle/Foot   Left Ankle/Foot   Intact: light touch    Right Ankle/Foot   Intact: light touch     Active Range of Motion   Left Ankle/Foot   Dorsiflexion (ke): 8 degrees   Plantar flexion: 52 degrees   Inversion: 42 degrees   Eversion: 12 degrees     Right Ankle/Foot   Dorsiflexion (ke): 10 degrees with pain  Plantar flexion: 65 degrees with pain  Inversion: 42 degrees   Eversion: 10 degrees    Joint Play   Left Ankle/Foot  Joints within functional limits are the fibular head, proximal tibiofibular joint, distal  tibiofibular joint, talocrural joint, subtalar joint, midfoot and forefoot.     Right Ankle/Foot  Joints within functional limits are the fibular head, proximal tibiofibular joint, distal tibiofibular joint, talocrural joint, subtalar joint, midfoot and forefoot.     Strength/Myotome Testing     Left Ankle/Foot   Dorsiflexion: 5  Plantar flexion: 5  Inversion: 5  Eversion: 5  Great toe flexion: 5  Great toe extension: 5    Right Ankle/Foot   Dorsiflexion: 5  Plantar flexion: 5  Inversion: 5  Eversion: 5  Great toe flexion: 5  Great toe extension:5    Swelling   Left Ankle/Foot   Metatarsal heads: 25 cm  Figure 8: 62 cm  Malleoli: 32 cm    Right Ankle/Foot   Metatarsal heads: 26 cm  Figure 8:59 cm  Malleoli: 32 cm    Ambulation   Weight-Bearing Status   Assistive device used: none    Observational Gait   Gait: antalgic   Decreased walking speed and stride length.      Assessment/Plan       Assessment/Plan  Assessment  Assessment details: Patient is a 53 y.o. year old female who presents to therapy with R ankle pain s/p R gastrocnemius recession, partial calcaneal exostectomy on 3/8/24.  At initial evaluation she presented with significant impairments including decreased RLE strength, decreased RLE ROM, impaired standing, walking, and activity tolerance, impaired FAAM and LEFS score and pain. Pt has demonstrated improvements in ROM, strengthening, stability, swelling, and FAAM score but continues to have limited standing and walking tolerance, and pain and swelling. Impairments affect ADL/IADL's, functional activities, recreational activities, and community activities. Pt will benefit from skilled physical therapy to address impairments, decrease pain and restore function.  Prognosis: good    Goals  Plan Goals: STG: 3 weeks.   1.  Pt with be independent with HEP to improve ankle ROM and reduce swelling/edema. - MET 5/16/24  2. Pt to improve her pain to <4/10 to be able to perform stairs, ambulation, and recreational  activities with less difficulty. - MET 24  3. Pt to demonstrate full/WFL (R) ankle AROM as compared to her (R) to be able to normalize gait mechanics and perform work related activities without difficulty. - MET 24    LT weeks.   1. Pt to improve her gross (R) ankle/LE strength to 5/5 by discharge to improve (R) ankle stability and activity tolerance. - MET 24  2. Pt to be able to ambulate throughout full work day with min to no reports of pain/discomfort. - NOT MET 24  3. Pt to improve her  FAAM and LEFS outcome measure to >80% function. - NOT MET 24  4. Pt to be able to return to all recreational activities without difficulty.    - NOT MET 24    Progress toward previous goals: Partially Met  Recommendations: Continue as planned  Timeframe: 1 month 1x a week  Prognosis to achieve goals: fair    PT Signature: Nicolasa pSivey PT, DPT            [place] Lic. # [id number]        Based upon review of the patient's progress and continued therapy plan, it is my medical opinion that Alfreda Mcgowan should continue physical therapy treatment at [facility]  [address]  [place] [address] [phone].    Signature: __________________________________  MARY Emanuel DPM    Timed:         Manual Therapy:         mins  35310;     Therapeutic Exercise:    10     mins  59215;     Neuromuscular Marty:    10    mins  96827;    Therapeutic Activity:     10     mins  96998;     Gait Training:           mins  92049;     Ultrasound:    8      mins  59767;    Ionto                                   mins   03400  Self Care                            mins   73954        Un-Timed:  Electrical Stimulation:         mins  18722 ( );  Dry Needling          mins self-pay  Traction          mins 48236  Low Eval          Mins  07641  Mod Eval          Mins  56121  High Eval                            Mins  60494  Re-Eval                               mins   39865        Timed Treatment:   38  mins   Total Treatment:     38   mins

## 2024-06-18 ENCOUNTER — TREATMENT (OUTPATIENT)
Dept: PHYSICAL THERAPY | Facility: CLINIC | Age: 54
End: 2024-06-18
Payer: COMMERCIAL

## 2024-06-18 DIAGNOSIS — M92.61 HAGLUND'S DEFORMITY OF RIGHT HEEL: ICD-10-CM

## 2024-06-18 DIAGNOSIS — M25.571 ACUTE RIGHT ANKLE PAIN: Primary | ICD-10-CM

## 2024-06-18 DIAGNOSIS — M21.861 ACQUIRED POSTERIOR EQUINUS, RIGHT: ICD-10-CM

## 2024-06-18 DIAGNOSIS — M92.61 HAGLUND'S DEFORMITY, RIGHT: ICD-10-CM

## 2024-06-18 DIAGNOSIS — M76.61 ACHILLES TENDINITIS, RIGHT LEG: ICD-10-CM

## 2024-06-18 DIAGNOSIS — S86.001A INJURY OF RIGHT ACHILLES TENDON, INITIAL ENCOUNTER: ICD-10-CM

## 2024-06-18 NOTE — PROGRESS NOTES
Physical Therapy Daily Treatment Note    [address] [place] [address]    VISIT#: 10    Subjective   Alfreda Mcgowan reports that she is doing well today with no major changes. Pt reports that she did have difficulty walking barefoot on a gymnastics floor  Pain Rating (0/10): 4    Objective     See Exercise, Manual, and Modality Logs for complete treatment.     Patient Education: Progress of therapy and POC    Assessment/Plan  Pt continues to have low to moderate levels of pain with greater amounts of standing exercises performed today with increased irritation as the session progressed with CP applied at the end of the session.     Plan  Progress per Plan of Care and Progress strengthening /stabilization /functional activity            Timed:         Manual Therapy:         mins  77012;     Therapeutic Exercise:    10     mins  85600;     Neuromuscular Marty:    10    mins  08453;    Therapeutic Activity:     10     mins  25557;     Gait Training:           mins  40800;     Ultrasound:          mins  33246;    Ionto                                   mins   96714  Self Care                            mins   61843    Un-Timed:  Electrical Stimulation:         mins  20402 ( );  Dry Needling          mins self-pay  Traction          mins 41114  Low Eval          Mins  63577  Mod Eval          Mins  31912  High Eval                            Mins  32244  Re-Eval                               mins  44523    Timed Treatment:   30   mins   Total Treatment:     30   mins    Nicolasa Spivey PT, DPT  Physical Therapist

## 2024-06-25 ENCOUNTER — OFFICE VISIT (OUTPATIENT)
Dept: PODIATRY | Facility: CLINIC | Age: 54
End: 2024-06-25
Payer: COMMERCIAL

## 2024-06-25 VITALS
RESPIRATION RATE: 20 BRPM | OXYGEN SATURATION: 98 % | BODY MASS INDEX: 43.35 KG/M2 | HEART RATE: 66 BPM | WEIGHT: 286 LBS | HEIGHT: 68 IN

## 2024-06-25 DIAGNOSIS — M76.61 ACHILLES TENDINITIS, RIGHT LEG: ICD-10-CM

## 2024-06-25 DIAGNOSIS — M77.31 CALCANEAL SPUR, RIGHT: ICD-10-CM

## 2024-06-25 DIAGNOSIS — M25.571 CHRONIC PAIN OF RIGHT ANKLE: Primary | ICD-10-CM

## 2024-06-25 DIAGNOSIS — G89.29 CHRONIC PAIN OF RIGHT ANKLE: Primary | ICD-10-CM

## 2024-06-25 DIAGNOSIS — M77.51 RETROCALCANEAL BURSITIS, RIGHT: ICD-10-CM

## 2024-06-25 DIAGNOSIS — E66.01 MORBID OBESITY WITH BMI OF 40.0-44.9, ADULT: ICD-10-CM

## 2024-06-26 NOTE — PROGRESS NOTES
06/25/2024  Foot and Ankle Surgery - Established Patient/Follow-up  Provider: Dr. Donald Emanuel DPM  Location: [facility]    Subjective:  Alfreda Mcgowan is a 53 y.o. female.     Chief Complaint   Patient presents with    Right Foot - Follow-up, Pain     Dr. Emanuel 3/8/2024 Gastrocnemius recession, right lower extremity  2.  Partial calcaneal exostectomy, right    Follow-up     PALMER Harriet last pcp visit 05/01/2024       History of Present Illness  The patient presents for evaluation of heel and leg pain.    The patient continues to experience discomfort in her heel and leg, albeit less severe than before. She was last evaluated on 04/23/2023, during which she reported an improvement in her condition. Despite not undergoing a repeat x-ray today, she has completed her physical therapy regimen, attending sessions at least twice a week. Despite her efforts to increase her physical activity, she has been unable to do so. She reports persistent swelling, which appears to be less severe today. The swelling has also been causing splitting, which exacerbates her pain. She has attempted to manage the condition with various treatments, including Aquaphor. She has an upcoming appointment on 06/30/2023 with a compounded Wegovy-type medication.   She works in a bank.      No Known Allergies    Current Outpatient Medications on File Prior to Visit   Medication Sig Dispense Refill    baclofen (LIORESAL) 10 MG tablet Take 1 tablet by mouth Daily As Needed for Muscle Spasms.      cetirizine (zyrTEC) 10 MG tablet Take 1 tablet by mouth Daily.      hydroCHLOROthiazide (HYDRODIURIL) 25 MG tablet Take 1 tablet by mouth Daily.      levothyroxine (SYNTHROID, LEVOTHROID) 125 MCG tablet Take 1 tablet by mouth Daily.      LORazepam (ATIVAN) 0.5 MG tablet Take 1 tablet by mouth Every 8 (Eight) Hours As Needed for Anxiety.      rosuvastatin (CRESTOR) 5 MG tablet Take 1 tablet by mouth Every Night.      sertraline (ZOLOFT) 100 MG tablet  "Take 1 tablet by mouth Every Night.      HYDROcodone-acetaminophen (NORCO) 7.5-325 MG per tablet Take 1 tablet by mouth Every 6 (Six) Hours As Needed for Moderate Pain (Pain). (Patient not taking: Reported on 3/25/2024) 28 tablet 0     No current facility-administered medications on file prior to visit.       Objective   Pulse 66   Resp 20   Ht 172.7 cm (68\")   Wt 130 kg (286 lb)   SpO2 98%   BMI 43.49 kg/m²     Foot/Ankle Exam  Physical Exam    GENERAL  Appearance:  appears stated age and obese  Orientation:  AAOx3  Affect:  appropriate  Gait:  unimpaired  Assistance:  independent  Right shoe gear: casual shoe  Left shoe gear: casual shoe     VASCULAR      Right Foot Vascularity   Normal vascular exam    Dorsalis pedis:  2+  Posterior tibial:  2+  Skin temperature:  warm  Edema grading:  None  CFT:  < 3 seconds  Pedal hair growth:  Present  Varicosities:  none      Left Foot Vascularity   Normal vascular exam    Dorsalis pedis:  2+  Posterior tibial:  2+  Skin temperature:  warm  Edema grading:  None  CFT:  < 3 seconds  Pedal hair growth:  Present  Varicosities:  none     NEUROLOGIC      Right Foot Neurologic   Normal sensation    Light touch sensation: normal  Vibratory sensation: normal  Hot/Cold sensation: normal  Normal reflexes    Achilles reflex:  2+  Babinski reflex:  2+      Left Foot Neurologic   Normal sensation    Light touch sensation: normal  Vibratory sensation: normal  Hot/Cold sensation:  normal  Normal reflexes    Achilles reflex:  2+  Babinski reflex:  2+     MUSCULOSKELETAL      Right Foot Musculoskeletal   Ecchymosis:  none  Arch:  Normal      Left Foot Musculoskeletal   Ecchymosis:  none  Arch:  Normal     MUSCLE STRENGTH      Right Foot Muscle Strength   Normal strength    Foot dorsiflexion:  5  Foot plantar flexion:  5  Foot inversion:  5  Foot eversion:  5      Left Foot Muscle Strength   Normal strength    Foot dorsiflexion:  5  Foot plantar flexion:  5  Foot inversion:  5  Foot " eversion:  5     RANGE OF MOTION      Right Foot Range of Motion   Foot and ankle ROM within normal limits        Left Foot Range of Motion   Foot and ankle ROM within normal limits       DERMATOLOGIC       Right Foot Dermatologic   Skin  Right foot skin is intact.       Left Foot Dermatologic   Skin  Left foot skin is intact.      Right foot additional comments: Incision sites are dry and stable with intact sutures and staples. No evidence of dehiscence or infection. No significant pain or limitation. Minimal swelling to the retrocalcaneal region.     4/23/24: No significant discomfort with palpation involving the posterior aspect of the heel.  Moderate tightness and discomfort involving the operative site.  No progressive deformity.  Strength is improving to the posterior aspect of the leg.  No significant calf pain     6/25/24: No significant discomfort involving the calf region.  Moderate discomfort involving the posterior medial aspect of the calcaneus with moderate swelling involving the retrocalcaneal bursa.  Achilles tendon appears to be intact with mild hypertrophy.    Results      Assessment & Plan   Diagnoses and all orders for this visit:    1. Chronic pain of right ankle (Primary)    2. Achilles tendinitis, right leg    3. Retrocalcaneal bursitis, right    4. Calcaneal spur, right    5. Morbid obesity with BMI of 40.0-44.9, adult      Assessment & Plan    Patient continues to have discomfort involving the Achilles tendon insertion site.  I explained that her symptoms are consistent with retrocalcaneal bursitis.  She has noticed improvement involving the calf and lower leg after surgery performed on March 8.  She has noticed progressive discomfort involving the Achilles tendon region.  Today, I do feel that she would benefit from a retrocalcaneal bursa injection.  I also feel that she would benefit from formal physical therapy.  We discussed at home exercises to perform.  We also reviewed appropriate  shoes and to avoid uneven terrain and increased activity.  I am hoping that symptoms continue to resolve.  If symptoms do not, we may need to consider Achilles tendon takedown and spur resection.  Patient understands and will monitor.  Patient is to follow-up in 6 weeks for reevaluation and imaging of the right foot.  Greater than 20 minutes spent before, during, and after evaluation for patient care.     Retrocalcaneal/ Achilles Steroid Injection: Right    Consent and time out was performed before proceeding with injection.  The skin about the lateral Kagar's triangle region of the right foot was cleansed with alcohol.  Ultrasound guidance was used to evaluate then Achilles tendon and injection guidance. Using an aseptic technique, a 1.5 mL solution containing 0.5 mL of 0.5% Marcaine plain, 0.5mL of 1% lidocaine plain and 0.5 mL of Kenalog was injected to the insertion site of the Achilles tendon. After the injection, compression was applied followed by a sterile bandage.  The patient noted relief from pain and tolerated the injection well without complication.               Patient or patient representative verbalized consent for the use of Ambient Listening during the visit with  MARY Emanuel DPM for chart documentation. 6/26/2024  07:59 EDT    MARY Emanuel DPM

## 2024-08-08 ENCOUNTER — OFFICE VISIT (OUTPATIENT)
Dept: PODIATRY | Facility: CLINIC | Age: 54
End: 2024-08-08
Payer: COMMERCIAL

## 2024-08-08 VITALS — WEIGHT: 286 LBS | RESPIRATION RATE: 20 BRPM | HEIGHT: 68 IN | BODY MASS INDEX: 43.35 KG/M2

## 2024-08-08 DIAGNOSIS — M76.61 ACHILLES TENDINITIS, RIGHT LEG: ICD-10-CM

## 2024-08-08 DIAGNOSIS — M77.51 RETROCALCANEAL BURSITIS, RIGHT: ICD-10-CM

## 2024-08-08 DIAGNOSIS — G89.29 CHRONIC PAIN OF RIGHT ANKLE: Primary | ICD-10-CM

## 2024-08-08 DIAGNOSIS — M77.31 CALCANEAL SPUR, RIGHT: ICD-10-CM

## 2024-08-08 DIAGNOSIS — E66.01 MORBID OBESITY WITH BMI OF 40.0-44.9, ADULT: ICD-10-CM

## 2024-08-08 DIAGNOSIS — M25.571 CHRONIC PAIN OF RIGHT ANKLE: Primary | ICD-10-CM

## 2024-08-09 ENCOUNTER — PATIENT MESSAGE (OUTPATIENT)
Dept: PODIATRY | Facility: CLINIC | Age: 54
End: 2024-08-09
Payer: COMMERCIAL

## 2024-08-09 NOTE — PROGRESS NOTES
08/08/2024  Foot and Ankle Surgery - Established Patient/Follow-up  Provider: Dr. Donald Emanuel DPM  Location: [facility]    Subjective:  Alfreda Mcgowan is a 54 y.o. female.     Chief Complaint   Patient presents with    Right Foot - Follow-up, Pain     Dr. Emanuel 3/8/2024 Gastrocnemius recession, right lower extremity  2.  Partial calcaneal exostectomy, right    Follow-up     PALMER Larios md 5/1/2024       History of Present Illness  The patient presents for evaluation of heel pain.    Six weeks ago, she received an injection on the right side of her heel. Despite this, she reports no significant change in her condition. Her activity level increased over the weekend, which exacerbated the pain. She also assisted her son in moving into a new house, which she believes may have exacerbated the pain. The pain started slightly before the move, but increased activity over the weekend exacerbated the pain. She is currently not attending physical therapy, but performs exercises at home with resistance bands for stretching. She has noticed increased swelling and is concerned about whether the swelling is due to fluid. Her condition has not returned to the level she experienced a year ago, but she is unable to cross her feet over each other. Three weeks ago, she had to lift and carry her 4-year-old granddaughter.      No Known Allergies    Current Outpatient Medications on File Prior to Visit   Medication Sig Dispense Refill    baclofen (LIORESAL) 10 MG tablet Take 1 tablet by mouth Daily As Needed for Muscle Spasms.      cetirizine (zyrTEC) 10 MG tablet Take 1 tablet by mouth Daily.      hydroCHLOROthiazide (HYDRODIURIL) 25 MG tablet Take 1 tablet by mouth Daily.      levothyroxine (SYNTHROID, LEVOTHROID) 125 MCG tablet Take 1 tablet by mouth Daily.      LORazepam (ATIVAN) 0.5 MG tablet Take 1 tablet by mouth Every 8 (Eight) Hours As Needed for Anxiety.      rosuvastatin (CRESTOR) 5 MG tablet Take 1 tablet by mouth  "Every Night.      sertraline (ZOLOFT) 100 MG tablet Take 1 tablet by mouth Every Night.       No current facility-administered medications on file prior to visit.       Objective   Resp 20   Ht 172.7 cm (68\")   Wt 130 kg (286 lb)   BMI 43.49 kg/m²     Foot/Ankle Exam  Physical Exam  GENERAL  Appearance:  appears stated age and obese  Orientation:  AAOx3  Affect:  appropriate  Gait:  unimpaired  Assistance:  independent  Right shoe gear: casual shoe  Left shoe gear: casual shoe     VASCULAR      Right Foot Vascularity   Normal vascular exam    Dorsalis pedis:  2+  Posterior tibial:  2+  Skin temperature:  warm  Edema grading:  None  CFT:  < 3 seconds  Pedal hair growth:  Present  Varicosities:  none      Left Foot Vascularity   Normal vascular exam    Dorsalis pedis:  2+  Posterior tibial:  2+  Skin temperature:  warm  Edema grading:  None  CFT:  < 3 seconds  Pedal hair growth:  Present  Varicosities:  none     NEUROLOGIC      Right Foot Neurologic   Normal sensation    Light touch sensation: normal  Vibratory sensation: normal  Hot/Cold sensation: normal  Normal reflexes    Achilles reflex:  2+  Babinski reflex:  2+      Left Foot Neurologic   Normal sensation    Light touch sensation: normal  Vibratory sensation: normal  Hot/Cold sensation:  normal  Normal reflexes    Achilles reflex:  2+  Babinski reflex:  2+     MUSCULOSKELETAL      Right Foot Musculoskeletal   Ecchymosis:  none  Arch:  Normal      Left Foot Musculoskeletal   Ecchymosis:  none  Arch:  Normal     MUSCLE STRENGTH      Right Foot Muscle Strength   Normal strength    Foot dorsiflexion:  5  Foot plantar flexion:  5  Foot inversion:  5  Foot eversion:  5      Left Foot Muscle Strength   Normal strength    Foot dorsiflexion:  5  Foot plantar flexion:  5  Foot inversion:  5  Foot eversion:  5     RANGE OF MOTION      Right Foot Range of Motion   Foot and ankle ROM within normal limits        Left Foot Range of Motion   Foot and ankle ROM within normal " limits       DERMATOLOGIC       Right Foot Dermatologic   Skin  Right foot skin is intact.       Left Foot Dermatologic   Skin  Left foot skin is intact.      Right foot additional comments: Incision sites are dry and stable with intact sutures and staples. No evidence of dehiscence or infection. No significant pain or limitation. Minimal swelling to the retrocalcaneal region.     4/23/24: No significant discomfort with palpation involving the posterior aspect of the heel.  Moderate tightness and discomfort involving the operative site.  No progressive deformity.  Strength is improving to the posterior aspect of the leg.  No significant calf pain     6/25/24: No significant discomfort involving the calf region.  Moderate discomfort involving the posterior medial aspect of the calcaneus with moderate swelling involving the retrocalcaneal bursa.  Achilles tendon appears to be intact with mild hypertrophy    8/8/24: Discomfort with palpation involving the posterior medial aspect of the heel.  Moderate swelling.  No obvious defect involving the Achilles tendon.  No progressive deformity or instability.  No prominent residual equinus contracture.      Results  Imaging  X-rays from January show no significant changes, only noticeable swelling where the tendon is located.    Assessment & Plan   Diagnoses and all orders for this visit:    1. Chronic pain of right ankle (Primary)    2. Calcaneal spur, right  -     XR Foot 3+ View Right    3. Achilles tendinitis, right leg    4. Retrocalcaneal bursitis, right    5. Morbid obesity with BMI of 40.0-44.9, adult      Assessment & Plan    The patient's discomfort in the back of her heel is likely due to destabilization of scar tissue. The purpose of the surgery is to alleviate the stress in the area. Formal physical therapy, such as manual therapy exercises, other modalities, e-stim, and ultrasound, may not be a viable option due to her active symptoms. If these measures do not  alleviate her symptoms, PRP injection or surgery may be considered. The swelling is not fluid-filled, but rather scar tissue. An MRI will be conducted to provide a more detailed understanding of the soft tissues before considering surgical intervention. She is advised to manage her discomfort and pain through rest, ice, compression, elevation, and anti-inflammatories. A repeat steroid injection was offered to alleviate discomfort, but she declined. Short-term use of the boot was recommended to offload and regulate her discomfort. Heavy activities and uneven terrain were discouraged. A pamphlet detailing PRP injection was provided.    Follow-up  A follow-up visit is scheduled for 6 weeks, or sooner if necessary.             Patient or patient representative verbalized consent for the use of Ambient Listening during the visit with  MARY Emanuel DPM for chart documentation. 8/9/2024  07:14 EDT    MARY Emanuel DPM

## 2024-09-19 ENCOUNTER — OFFICE VISIT (OUTPATIENT)
Dept: PODIATRY | Facility: CLINIC | Age: 54
End: 2024-09-19
Payer: COMMERCIAL

## 2024-09-19 VITALS
HEART RATE: 83 BPM | RESPIRATION RATE: 20 BRPM | OXYGEN SATURATION: 93 % | HEIGHT: 68 IN | BODY MASS INDEX: 43.35 KG/M2 | WEIGHT: 286 LBS

## 2024-09-19 DIAGNOSIS — M76.61 ACHILLES TENDINITIS, RIGHT LEG: ICD-10-CM

## 2024-09-19 DIAGNOSIS — M25.571 CHRONIC PAIN OF RIGHT ANKLE: Primary | ICD-10-CM

## 2024-09-19 DIAGNOSIS — M77.31 CALCANEAL SPUR, RIGHT: ICD-10-CM

## 2024-09-19 DIAGNOSIS — G89.29 CHRONIC PAIN OF RIGHT ANKLE: Primary | ICD-10-CM

## 2024-09-19 DIAGNOSIS — E66.01 MORBID OBESITY WITH BMI OF 40.0-44.9, ADULT: ICD-10-CM

## 2024-09-19 DIAGNOSIS — M77.51 RETROCALCANEAL BURSITIS, RIGHT: ICD-10-CM

## 2024-10-30 ENCOUNTER — TREATMENT (OUTPATIENT)
Dept: PHYSICAL THERAPY | Facility: CLINIC | Age: 54
End: 2024-10-30
Payer: COMMERCIAL

## 2024-10-30 DIAGNOSIS — M21.6X1 EQUINUS DEFORMITY OF RIGHT FOOT: ICD-10-CM

## 2024-10-30 DIAGNOSIS — G89.29 CHRONIC PAIN OF RIGHT ANKLE: Primary | ICD-10-CM

## 2024-10-30 DIAGNOSIS — M77.31 CALCANEAL SPUR OF FOOT, RIGHT: ICD-10-CM

## 2024-10-30 DIAGNOSIS — M77.51 POSTCALCANEAL BURSITIS OF RIGHT FOOT: ICD-10-CM

## 2024-10-30 DIAGNOSIS — M76.61 ACHILLES TENDINITIS OF RIGHT LOWER EXTREMITY: ICD-10-CM

## 2024-10-30 DIAGNOSIS — M92.61 ACQUIRED HAGLUND'S DEFORMITY OF RIGHT HEEL: ICD-10-CM

## 2024-10-30 DIAGNOSIS — M25.571 CHRONIC PAIN OF RIGHT ANKLE: Primary | ICD-10-CM

## 2024-10-30 DIAGNOSIS — M77.41 METATARSALGIA OF RIGHT FOOT: ICD-10-CM

## 2024-10-30 NOTE — PROGRESS NOTES
Physical Therapy Initial Evaluation and Plan of Care        [address], [address] [place] [address]     Patient: Alfreda Mcgowan   : 1970  Diagnosis/ICD-10 Code:  Chronic pain of right ankle [M25.571, G89.29]  Referring practitioner: MARY Emanuel DPM  Date of Initial Visit: 10/30/2024  Today's Date: 10/30/2024  Patient seen for 1 sessions           Subjective Questionnaire: LEFS: 55% limited; FAAM:  59%      Subjective Evaluation    History of Present Illness  Mechanism of injury: Pt presents to OP PT with R ankle and foot pain that started about 6 years ago with an injury.  She had a right gastrocnemius recession and partial calcaneal exostectomy on 3/8/24 and had some temporary relief with pain increasing more recently.       Patient Occupation: bank teller Pain  Current pain ratin  At best pain ratin  At worst pain ratin  Location: right ankle and achilles tendon  Quality: knife-like, sharp and tight  Relieving factors: rest and change in position  Aggravating factors: sleeping, ambulation, standing, stairs, movement, squatting and repetitive movement  Progression: worsening    Social Support  Lives in: one-story house (2 steps into house with railing)  Lives with: spouse and adult children    Hand dominance: right    Diagnostic Tests  X-ray: abnormal    Treatments  Previous treatment: physical therapy (surgery)  Current treatment: physical therapy  Patient Goals  Patient goals for therapy: return to sport/leisure activities, decreased pain, decreased edema, improved balance, increased motion, increased strength and independence with ADLs/IADLs  Patient goal: hike and walk dogs without pain       24 R foot XR IMPRESSION:  Impression:  1. No acute osseous abnormality of the right foot.  2. Large plantar calcaneal heel spur and distal Achilles insertional enthesopathy.    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  Gastrocnemius  recession, right lower extremity  2.  Partial calcaneal exostectomy, right    Objective          Observations     Right Ankle/Foot   Positive for deformity, edema and incision.     Additional Ankle/Foot Observation Details  Romi's deformity R    Palpation     Right   No palpable tenderness to the anterior tibialis, lateral gastrocnemius, medial gastrocnemius, peroneus and soleus.   Hypertonic in the lateral gastrocnemius, medial gastrocnemius and posterior tibialis. Tenderness of the posterior tibialis.     Tenderness   Left Ankle/Foot   No tenderness.     Right Ankle/Foot   Tenderness in the Achilles insertion, calcaneofibular ligament, deltoid ligament, medial calcaneus, posterior tibial tendon and proximal Achilles. No tenderness in the anterior ankle, fifth metatarsal base, dorsum foot, fibula, first metatarsal head, lateral malleolus, medial malleolus, metatarsal head, mid-plantar aspect, navicular and plantar fascia.     Neurological Testing     Sensation     Ankle/Foot   Left Ankle/Foot   Intact: light touch    Right Ankle/Foot   Intact: light touch     Active Range of Motion   Left Ankle/Foot   Dorsiflexion (ke): 15 degrees   Plantar flexion: 40 degrees   Inversion: 48 degrees   Eversion: 20 degrees     Right Ankle/Foot   Dorsiflexion (ke): 20 degrees with pain  Plantar flexion: 48 degrees   Inversion: 50 degrees   Eversion: 30 degrees     Joint Play   Left Ankle/Foot  Joints within functional limits are the midfoot and forefoot.     Right Ankle/Foot  Joints within functional limits are the midfoot and forefoot.     Strength/Myotome Testing     Left Ankle/Foot   Dorsiflexion: 5  Plantar flexion: 4+  Inversion: 5  Eversion: 5    Right Ankle/Foot   Dorsiflexion: 4+  Plantar flexion: 4+  Inversion: 4+  Eversion: 4+  Great toe flexion: 4+  Great toe extension: 4+    Additional Strength Details  In supine    Swelling   Left Ankle/Foot   Figure 8: 59 cm    Right Ankle/Foot   Figure 8: 57 cm      See Exercise,  Manual, and Modality Logs for complete treatment.     Assessment & Plan       Assessment  Impairments: abnormal gait, abnormal muscle tone, abnormal or restricted ROM, activity intolerance, impaired balance, impaired physical strength, lacks appropriate home exercise program, pain with function and weight-bearing intolerance   Functional limitations: sleeping, walking, uncomfortable because of pain, standing and unable to perform repetitive tasks   Assessment details: The patient is a 54 y.o. female who presents to physical therapy today for R foot and ankle pain. Upon initial evaluation, the patient demonstrates the following impairments: R Romi's deformity, ttp and inflammation in R ankle ligaments and medial calcaneus, R ankle ROM and functional impairments. Due to these impairments, the patient is unable to walk, stand for prolonged periods, hike and walk dogs, sleep without pain. The patient would benefit from skilled PT services to address functional limitations and impairments and to improve patient quality of life.        Goals  Plan Goals: STGs:  4 weeks:  1.  Pt will tolerate initial HEP  2.  Pt will report decreased pain.  3.  Pt will be able to tolerate walking her dogs without pain limiting her.    LTGs:  12 weeks:  1.  Pt will be I with HEP  2.  Pt will report no pain in R ankle/foot during gait.  3.  Pt will be able to stand for 3 hours without pain at work.     Plan  Therapy options: will be seen for skilled therapy services  Planned modality interventions: cryotherapy, dry needling, TENS, thermotherapy (hydrocollator packs), ultrasound and high voltage pulsed current (pain management)  Planned therapy interventions: manual therapy, neuromuscular re-education, soft tissue mobilization, strengthening, stretching, therapeutic activities, joint mobilization, abdominal trunk stabilization, balance/weight-bearing training, flexibility, functional ROM exercises, gait training and home exercise  program  Frequency: 1x week  Duration in weeks: 12  Treatment plan discussed with: patient  Plan details: DN next visit - form completed        Visit Diagnoses:    ICD-10-CM ICD-9-CM   1. Chronic pain of right ankle  M25.571 719.47    G89.29 338.29   2. Acquired Romi's deformity of right heel  M92.61 732.5   3. Metatarsalgia of right foot  M77.41 726.70   4. Achilles tendinitis of right lower extremity  M76.61 726.71   5. Equinus deformity of right foot  M21.6X1 736.79   6. Calcaneal spur of foot, right  M77.31 726.73   7. Postcalcaneal bursitis of right foot  M77.51 726.79       History # of Personal Factors and/or Comorbidities: MODERATE (1-2)  Examination of Body System(s): # of elements: LOW (1-2)  Clinical Presentation: STABLE   Clinical Decision Making: LOW       Timed:         Manual Therapy:         mins  61848;     Therapeutic Exercise:    8     mins  44758;     Neuromuscular Marty:        mins  82224;    Therapeutic Activity:          mins  42137;     Gait Training:           mins  57517;     Ultrasound:          mins  60471;    Ionto                                   mins   40087  Self Care                       8     mins   49357  Canalith Repos         mins 06848      Un-Timed:  Electrical Stimulation:         mins  10574 ( );  Dry Needling          mins self-pay  Traction          mins 57191  Low Eval      30    Mins  45783  Mod Eval          Mins  75207  High Eval                            Mins  03295  Re-Eval                               mins  88496    Timed Treatment:   16   mins   Total Treatment:     46   mins    PT SIGNATURE: Enedelia Connolly PT         Initial Certification  Certification Period: 10/30/2024 thru 1/28/2025  I certify that the therapy services are furnished while this patient is under my care.  The services outlined above are required by this patient, and will be reviewed every 90 days.     PHYSICIAN: MARY Emanuel DPM      DATE:     Please sign and return via fax to  [phone].. Thank you, [facility].

## 2024-11-12 ENCOUNTER — TREATMENT (OUTPATIENT)
Dept: PHYSICAL THERAPY | Facility: CLINIC | Age: 54
End: 2024-11-12
Payer: COMMERCIAL

## 2024-11-12 DIAGNOSIS — M21.6X1 EQUINUS DEFORMITY OF RIGHT FOOT: ICD-10-CM

## 2024-11-12 DIAGNOSIS — M77.31 CALCANEAL SPUR OF FOOT, RIGHT: ICD-10-CM

## 2024-11-12 DIAGNOSIS — M76.61 ACHILLES TENDINITIS OF RIGHT LOWER EXTREMITY: ICD-10-CM

## 2024-11-12 DIAGNOSIS — M77.51 POSTCALCANEAL BURSITIS OF RIGHT FOOT: Primary | ICD-10-CM

## 2024-11-12 DIAGNOSIS — M25.571 CHRONIC PAIN OF RIGHT ANKLE: ICD-10-CM

## 2024-11-12 DIAGNOSIS — M77.41 METATARSALGIA OF RIGHT FOOT: ICD-10-CM

## 2024-11-12 DIAGNOSIS — G89.29 CHRONIC PAIN OF RIGHT ANKLE: ICD-10-CM

## 2024-11-12 DIAGNOSIS — M92.61 ACQUIRED HAGLUND'S DEFORMITY OF RIGHT HEEL: ICD-10-CM

## 2024-11-12 NOTE — PROGRESS NOTES
Physical Therapy Daily Treatment Note  [facility], [address] [place] [address]     Patient: Alfreda Mcgowan   : 1970  Referring practitioner: MARY Emanuel DPM  Diagnosis:      ICD-10-CM ICD-9-CM   1. Postcalcaneal bursitis of right foot  M77.51 726.79   2. Chronic pain of right ankle  M25.571 719.47    G89.29 338.29   3. Acquired Romi's deformity of right heel  M92.61 732.5   4. Metatarsalgia of right foot  M77.41 726.70   5. Achilles tendinitis of right lower extremity  M76.61 726.71   6. Equinus deformity of right foot  M21.6X1 736.79   7. Calcaneal spur of foot, right  M77.31 726.73     Date of Initial Visit: Type: THERAPY  Noted: 10/30/2024  Today's Date: 2024    VISIT#: 2          Subjective   Alfreda Mcgowan reports: 310 pain level in the right ankle,    Objective   See Exercise, Manual, and Modality Logs for complete treatment.       Assessment & Plan       Assessment  Assessment details: Reviewed HEP and progressed stretching and added NuStep for warm up at start of session.  Pt had dry needling today to decrease soft tissue tension and pain in R foot/ankle.  Assess benefits at next visit.  Added prone calf stretches after needling to improve flexibility in new ROM.            Progress per Plan of Care and Progress strengthening /stabilization /functional activity           Timed:  Manual Therapy:         mins  28001;  Therapeutic Exercise:    8     mins  39251;     Neuromuscular Marty:        mins  21150;    Therapeutic Activity:     8     mins  00404;     Gait Training:           mins  86806;     Ultrasound:          mins  35248;    Electrical Stimulation:         mins  19546 ( );    Untimed:  Electrical Stimulation:         mins  46185 ( );  Mechanical Traction:         mins  77104;   Dry needling:    15   Self pay    Timed Treatment:   16   mins   Total Treatment:     31   mins  Enedelia Connolly, PT, DPT, CLT, CIDN  Physical Therapist

## 2024-11-20 ENCOUNTER — TREATMENT (OUTPATIENT)
Dept: PHYSICAL THERAPY | Facility: CLINIC | Age: 54
End: 2024-11-20
Payer: COMMERCIAL

## 2024-11-20 DIAGNOSIS — M77.51 POSTCALCANEAL BURSITIS OF RIGHT FOOT: Primary | ICD-10-CM

## 2024-11-20 DIAGNOSIS — M77.41 METATARSALGIA OF RIGHT FOOT: ICD-10-CM

## 2024-11-20 DIAGNOSIS — G89.29 CHRONIC PAIN OF RIGHT ANKLE: ICD-10-CM

## 2024-11-20 DIAGNOSIS — M92.61 ACQUIRED HAGLUND'S DEFORMITY OF RIGHT HEEL: ICD-10-CM

## 2024-11-20 DIAGNOSIS — M21.6X1 EQUINUS DEFORMITY OF RIGHT FOOT: ICD-10-CM

## 2024-11-20 DIAGNOSIS — M76.61 ACHILLES TENDINITIS OF RIGHT LOWER EXTREMITY: ICD-10-CM

## 2024-11-20 DIAGNOSIS — M77.31 CALCANEAL SPUR OF FOOT, RIGHT: ICD-10-CM

## 2024-11-20 DIAGNOSIS — M25.571 CHRONIC PAIN OF RIGHT ANKLE: ICD-10-CM

## 2024-11-20 NOTE — PROGRESS NOTES
Physical Therapy Daily Treatment Note  [address], [address] [place] [address]     Patient: Alfreda Mcgowan   : 1970  Referring practitioner: MARY Emanuel DPM  Diagnosis:      ICD-10-CM ICD-9-CM   1. Postcalcaneal bursitis of right foot  M77.51 726.79   2. Chronic pain of right ankle  M25.571 719.47    G89.29 338.29   3. Acquired Romi's deformity of right heel  M92.61 732.5   4. Metatarsalgia of right foot  M77.41 726.70   5. Achilles tendinitis of right lower extremity  M76.61 726.71   6. Equinus deformity of right foot  M21.6X1 736.79   7. Calcaneal spur of foot, right  M77.31 726.73     Date of Initial Visit: Type: THERAPY  Noted: 10/30/2024  Today's Date: 2024    VISIT#: 3          Subjective   Alfreda Mcgowan reports: 3/10 pain level in the right lateral and posterior ankle today.      Objective   See Exercise, Manual, and Modality Logs for complete treatment.       Assessment & Plan       Assessment  Assessment details: Focused on balance and stabilization with functional hip strengthening today with cues for proper form and execution.           Progress per Plan of Care and Progress strengthening /stabilization /functional activity           Timed:  Manual Therapy:     8    mins  09239;  Therapeutic Exercise:    8     mins  60410;     Neuromuscular Marty:  8   mins  08323;    Therapeutic Activity:     8     mins  40586;     Gait Training:           mins  33923;     Ultrasound:          mins  71041;    Electrical Stimulation:         mins  18616 ( );    Untimed:  Electrical Stimulation:         mins  39380 ( );  Mechanical Traction:         mins  47129;   Dry needling:       Self pay    Timed Treatment:   32   mins   Total Treatment:     32   mins  Enedelia Connolly, PT, DPT, CLT, CIDN  Physical Therapist

## 2024-11-26 ENCOUNTER — TREATMENT (OUTPATIENT)
Dept: PHYSICAL THERAPY | Facility: CLINIC | Age: 54
End: 2024-11-26
Payer: COMMERCIAL

## 2024-11-26 DIAGNOSIS — M92.61 ACQUIRED HAGLUND'S DEFORMITY OF RIGHT HEEL: ICD-10-CM

## 2024-11-26 DIAGNOSIS — M77.51 POSTCALCANEAL BURSITIS OF RIGHT FOOT: ICD-10-CM

## 2024-11-26 DIAGNOSIS — M77.31 CALCANEAL SPUR OF FOOT, RIGHT: Primary | ICD-10-CM

## 2024-11-26 DIAGNOSIS — G89.29 CHRONIC PAIN OF RIGHT ANKLE: ICD-10-CM

## 2024-11-26 DIAGNOSIS — M77.41 METATARSALGIA OF RIGHT FOOT: ICD-10-CM

## 2024-11-26 DIAGNOSIS — M25.571 CHRONIC PAIN OF RIGHT ANKLE: ICD-10-CM

## 2024-11-26 DIAGNOSIS — M21.6X1 EQUINUS DEFORMITY OF RIGHT FOOT: ICD-10-CM

## 2024-11-26 DIAGNOSIS — M76.61 ACHILLES TENDINITIS OF RIGHT LOWER EXTREMITY: ICD-10-CM

## 2024-11-26 NOTE — PROGRESS NOTES
Physical Therapy Daily Treatment Note  [facility], [address] [place] [address]     Patient: Alfreda Mcgowan   : 1970  Referring practitioner: MARY Emanuel DPM  Diagnosis:      ICD-10-CM ICD-9-CM   1. Calcaneal spur of foot, right  M77.31 726.73   2. Postcalcaneal bursitis of right foot  M77.51 726.79   3. Chronic pain of right ankle  M25.571 719.47    G89.29 338.29   4. Acquired Romi's deformity of right heel  M92.61 732.5   5. Achilles tendinitis of right lower extremity  M76.61 726.71   6. Equinus deformity of right foot  M21.6X1 736.79   7. Metatarsalgia of right foot  M77.41 726.70     Date of Initial Visit: Type: THERAPY  Noted: 10/30/2024  Today's Date: 2024    VISIT#: 4          Subjective   Alfreda Mcgowan reports: some swelling this morning, which doesn't usually occur until the end of the day.     Objective   See Exercise, Manual, and Modality Logs for complete treatment.       Assessment & Plan       Assessment  Assessment details: Ice massage performed to decrease soft tissue tension and inflammation in posterior tibialis tendon.            Progress per Plan of Care and Progress strengthening /stabilization /functional activity           Timed:  Manual Therapy:    10     mins  16111;  Therapeutic Exercise:     8    mins  29431;     Neuromuscular Marty:    8    mins  31601;    Therapeutic Activity:     8     mins  41684;     Gait Training:           mins  63856;     Ultrasound:          mins  92146;    Electrical Stimulation:         mins  29629 ( );    Untimed:  Electrical Stimulation:         mins  94262 ( );  Mechanical Traction:         mins  29695;   Dry needling:       Self pay    Timed Treatment:   34   mins   Total Treatment:     34   mins  Enedelia Connolly PT, DPT, CLT, CIDN  Physical Therapist

## 2024-12-04 ENCOUNTER — TREATMENT (OUTPATIENT)
Dept: PHYSICAL THERAPY | Facility: CLINIC | Age: 54
End: 2024-12-04
Payer: COMMERCIAL

## 2024-12-04 DIAGNOSIS — M92.61 ACQUIRED HAGLUND'S DEFORMITY OF RIGHT HEEL: ICD-10-CM

## 2024-12-04 DIAGNOSIS — M77.41 METATARSALGIA OF RIGHT FOOT: ICD-10-CM

## 2024-12-04 DIAGNOSIS — M77.51 POSTCALCANEAL BURSITIS OF RIGHT FOOT: ICD-10-CM

## 2024-12-04 DIAGNOSIS — M25.571 CHRONIC PAIN OF RIGHT ANKLE: ICD-10-CM

## 2024-12-04 DIAGNOSIS — G89.29 CHRONIC PAIN OF RIGHT ANKLE: ICD-10-CM

## 2024-12-04 DIAGNOSIS — M77.31 CALCANEAL SPUR OF FOOT, RIGHT: Primary | ICD-10-CM

## 2024-12-04 DIAGNOSIS — M21.6X1 EQUINUS DEFORMITY OF RIGHT FOOT: ICD-10-CM

## 2024-12-04 DIAGNOSIS — M76.61 ACHILLES TENDINITIS OF RIGHT LOWER EXTREMITY: ICD-10-CM

## 2024-12-04 PROCEDURE — 20560 NDL INSJ W/O NJX 1 OR 2 MUSC: CPT | Performed by: PHYSICAL THERAPIST

## 2024-12-04 PROCEDURE — 97530 THERAPEUTIC ACTIVITIES: CPT | Performed by: PHYSICAL THERAPIST

## 2024-12-04 PROCEDURE — 97112 NEUROMUSCULAR REEDUCATION: CPT | Performed by: PHYSICAL THERAPIST

## 2024-12-04 NOTE — PROGRESS NOTES
Physical Therapy Daily Treatment Note  [facility], [address] [place] [address]     Patient: Alfreda Mcgowan   : 1970  Referring practitioner: MARY Emanuel DPM  Diagnosis:      ICD-10-CM ICD-9-CM   1. Calcaneal spur of foot, right  M77.31 726.73   2. Postcalcaneal bursitis of right foot  M77.51 726.79   3. Chronic pain of right ankle  M25.571 719.47    G89.29 338.29   4. Acquired Romi's deformity of right heel  M92.61 732.5   5. Achilles tendinitis of right lower extremity  M76.61 726.71   6. Equinus deformity of right foot  M21.6X1 736.79   7. Metatarsalgia of right foot  M77.41 726.70     Date of Initial Visit: Type: THERAPY  Noted: 10/30/2024  Today's Date: 2024    VISIT#: 5          Subjective   Alfreda Mcgowan reports: 3-10 pain level today in the right ankle.     Objective   See Exercise, Manual, and Modality Logs for complete treatment.       Assessment & Plan       Assessment  Assessment details: Pt feels the stretching is helping decrease tightness, and she is still unable to wear shoes with a heel.  She is still limited with prolonged walking and standing and can only walk a few blocks before the pain occurs.            Anticipate DC next Visit           Timed:  Manual Therapy:         mins  90477;  Therapeutic Exercise:         mins  94815;     Neuromuscular Marty:   8     mins  83365;    Therapeutic Activity:     8     mins  43976;     Gait Training:           mins  80955;     Ultrasound:          mins  22276;    Electrical Stimulation:         mins  29677 ( );    Untimed:  Electrical Stimulation:         mins  65758 ( );  Mechanical Traction:         mins  19812;   Dry needlin   Self pay    Timed Treatment:   16   mins   Total Treatment:     30   mins  Enedelia Connolly, PT, PATRICKT, CLT, ROSHNIN  Physical Therapist

## 2024-12-11 ENCOUNTER — TREATMENT (OUTPATIENT)
Dept: PHYSICAL THERAPY | Facility: CLINIC | Age: 54
End: 2024-12-11
Payer: COMMERCIAL

## 2024-12-11 DIAGNOSIS — M77.31 CALCANEAL SPUR OF FOOT, RIGHT: Primary | ICD-10-CM

## 2024-12-11 DIAGNOSIS — M76.61 ACHILLES TENDINITIS OF RIGHT LOWER EXTREMITY: ICD-10-CM

## 2024-12-11 DIAGNOSIS — M77.41 METATARSALGIA OF RIGHT FOOT: ICD-10-CM

## 2024-12-11 DIAGNOSIS — M77.51 POSTCALCANEAL BURSITIS OF RIGHT FOOT: ICD-10-CM

## 2024-12-11 DIAGNOSIS — M21.6X1 EQUINUS DEFORMITY OF RIGHT FOOT: ICD-10-CM

## 2024-12-11 DIAGNOSIS — M92.61 ACQUIRED HAGLUND'S DEFORMITY OF RIGHT HEEL: ICD-10-CM

## 2024-12-11 DIAGNOSIS — G89.29 CHRONIC PAIN OF RIGHT ANKLE: ICD-10-CM

## 2024-12-11 DIAGNOSIS — M25.571 CHRONIC PAIN OF RIGHT ANKLE: ICD-10-CM

## 2024-12-11 NOTE — PROGRESS NOTES
Physical Therapy Daily Treatment Note/Discharge Note  [address], [address] [place] [address]     Patient: Alfreda Mcgowan   : 1970  Referring practitioner: MARY Emanuel DPM  Diagnosis:      ICD-10-CM ICD-9-CM   1. Calcaneal spur of foot, right  M77.31 726.73   2. Postcalcaneal bursitis of right foot  M77.51 726.79   3. Chronic pain of right ankle  M25.571 719.47    G89.29 338.29   4. Acquired Romi's deformity of right heel  M92.61 732.5   5. Metatarsalgia of right foot  M77.41 726.70   6. Equinus deformity of right foot  M21.6X1 736.79   7. Achilles tendinitis of right lower extremity  M76.61 726.71     Date of Initial Visit: Type: THERAPY  Noted: 10/30/2024  Today's Date: 2024    VISIT#: 6          Subjective   Alfreda Mcgowan reports: 8/10 pain level today in her right posterolateral ankle after doing too much this weekend.      Objective   See Exercise, Manual, and Modality Logs for complete treatment.       Assessment & Plan       Assessment  Assessment details: She states she is 75-80% overall improved since starting therapy, noting she has more ease with her right ankle/foot motion.  Pt will be discharged with maintenance HEP today and has met goals as noted below.  She has 54% limitation on the LEFI and 45% limited on her FAAM.      Goals  Plan Goals: STGs:  4 weeks:  1.  Pt will tolerate initial HEP - MET  2.  Pt will report decreased pain. - MET  3.  Pt will be able to tolerate walking her dogs without pain limiting her.- NOT MET    LTGs:  12 weeks:  1.  Pt will be I with HEP - MET  2.  Pt will report no pain in R ankle/foot during gait. - MET 85% per subjective report  3.  Pt will be able to stand for 3 hours without pain at work. - MET with minimal pain              Other:  Discharge with maintenance HEP           Timed:  Manual Therapy:         mins  18115;  Therapeutic Exercise:         mins  29315;     Neuromuscular Marty:    8    mins  36141;    Therapeutic Activity:     8      mins  93494;     Gait Training:           mins  78656;     Ultrasound:          mins  45738;    Electrical Stimulation:         mins  14211 ( );  Self-care:   8      mins  92081    Untimed:  Electrical Stimulation:         mins  43741 ( );  Mechanical Traction:         mins  61127;   Dry needling:       Self pay    Timed Treatment:   24   mins   Total Treatment:     24   mins  Enedelia Connolly PT, DPT, CLT, ROSHNIN  Physical Therapist

## 2025-01-15 ENCOUNTER — TELEPHONE (OUTPATIENT)
Dept: PODIATRY | Age: 55
End: 2025-01-15

## 2025-01-15 DIAGNOSIS — M76.61 ACHILLES TENDINITIS, RIGHT LEG: ICD-10-CM

## 2025-01-15 DIAGNOSIS — M77.51 RETROCALCANEAL BURSITIS, RIGHT: ICD-10-CM

## 2025-01-15 DIAGNOSIS — M21.861 ACQUIRED POSTERIOR EQUINUS, RIGHT: ICD-10-CM

## 2025-01-15 DIAGNOSIS — M25.571 CHRONIC PAIN OF RIGHT ANKLE: Primary | ICD-10-CM

## 2025-01-15 DIAGNOSIS — G89.29 CHRONIC PAIN OF RIGHT ANKLE: Primary | ICD-10-CM

## 2025-01-15 NOTE — TELEPHONE ENCOUNTER
MRI order placed and sent to scheduling. Called and LVM to call the office, I did leave on her voicemail that the order was placed and sent to scheduling. (Ok per  Verbal Release)   Concentration Of Solution Injected (Mg/Ml): 10.0

## 2025-01-15 NOTE — TELEPHONE ENCOUNTER
Caller: Alfreda Mcgwoan    Relationship: Self    Best call back number:     What orders are you requesting (i.e. lab or imaging): RT ANKLE MRI    In what timeframe would the patient need to come in: ASAP    Where will you receive your lab/imaging services: [facility]

## 2025-01-20 ENCOUNTER — HOSPITAL ENCOUNTER (OUTPATIENT)
Dept: MRI IMAGING | Facility: HOSPITAL | Age: 55
Discharge: HOME | End: 2025-01-20
Admitting: PODIATRIST
Payer: COMMERCIAL

## 2025-01-20 DIAGNOSIS — G89.29 CHRONIC PAIN OF RIGHT ANKLE: ICD-10-CM

## 2025-01-20 DIAGNOSIS — M25.571 CHRONIC PAIN OF RIGHT ANKLE: ICD-10-CM

## 2025-01-20 DIAGNOSIS — M77.51 RETROCALCANEAL BURSITIS, RIGHT: ICD-10-CM

## 2025-01-20 DIAGNOSIS — M76.61 ACHILLES TENDINITIS, RIGHT LEG: ICD-10-CM

## 2025-01-20 DIAGNOSIS — M21.861 ACQUIRED POSTERIOR EQUINUS, RIGHT: ICD-10-CM

## 2025-01-20 PROCEDURE — 73721 MRI JNT OF LWR EXTRE W/O DYE: CPT

## 2025-01-30 ENCOUNTER — TELEPHONE (OUTPATIENT)
Dept: PODIATRY | Age: 55
End: 2025-01-30

## 2025-01-30 NOTE — TELEPHONE ENCOUNTER
Caller: Alfreda Mcgowan    Relationship to patient: Self    Best call back number: [phone]    Chief complaint: RIGHT ANKLE    Type of visit: FOLLOW UP TO MRI    Requested date: ASAP     If rescheduling, when is the original appointment: 02/25/25     Additional notes:SCHEDULED FOR 1ST AVAILABLE, ASKING TO BE WORKED IN SOONER

## 2025-02-04 ENCOUNTER — OFFICE VISIT (OUTPATIENT)
Age: 55
End: 2025-02-04
Payer: COMMERCIAL

## 2025-02-04 VITALS — HEART RATE: 69 BPM | HEIGHT: 68 IN | WEIGHT: 286 LBS | BODY MASS INDEX: 43.35 KG/M2

## 2025-02-04 DIAGNOSIS — M76.61 ACHILLES TENDINITIS, RIGHT LEG: ICD-10-CM

## 2025-02-04 DIAGNOSIS — M21.861 ACQUIRED POSTERIOR EQUINUS, RIGHT: ICD-10-CM

## 2025-02-04 DIAGNOSIS — M25.571 CHRONIC PAIN OF RIGHT ANKLE: Primary | ICD-10-CM

## 2025-02-04 DIAGNOSIS — M77.51 RETROCALCANEAL BURSITIS, RIGHT: ICD-10-CM

## 2025-02-04 DIAGNOSIS — E66.01 MORBID OBESITY WITH BMI OF 40.0-44.9, ADULT: ICD-10-CM

## 2025-02-04 DIAGNOSIS — G89.29 CHRONIC PAIN OF RIGHT ANKLE: Primary | ICD-10-CM

## 2025-02-05 PROBLEM — M77.51 RETROCALCANEAL BURSITIS, RIGHT: Status: ACTIVE | Noted: 2025-02-04

## 2025-02-05 NOTE — PROGRESS NOTES
02/04/2025  Foot and Ankle Surgery - Established Patient/Follow-up  Provider: Dr. Donald Emanuel DPM  Location: [facility]    Subjective:  Alfreda Mcgowan is a 54 y.o. female.     Chief Complaint   Patient presents with    Right Ankle - Pain, Follow-up     Pain 5    Right Foot - Follow-up, Pain     Pain 5     Follow-up     Last saw DEIDRA Larios MD 11/18/2024       History of Present Illness  The patient presents for evaluation of right leg pain.    She was last seen on 09/19/2024, during which various treatment options were discussed, including PRP, dry needling, and surgery. An MRI was also planned at that time. She opted to complete her physical therapy before returning for a follow-up visit. Despite the therapy, she continues to experience significant pain in her right leg, particularly when walking or standing. She also reports persistent swelling in the affected area. 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 She has communicated with her manager about the possibility of requiring a 4-week leave due to her condition. She has adult children who reside with her and work night shifts, providing her with daytime support.    SOCIAL HISTORY  She is employed at a bank.      No Known Allergies    Current Outpatient Medications on File Prior to Visit   Medication Sig Dispense Refill    baclofen (LIORESAL) 10 MG tablet Take 1 tablet by mouth Daily As Needed for Muscle Spasms.      cetirizine (zyrTEC) 10 MG tablet Take 1 tablet by mouth Daily.      hydroCHLOROthiazide (HYDRODIURIL) 25 MG tablet Take 1 tablet by mouth Daily.      levothyroxine (SYNTHROID, LEVOTHROID) 125 MCG tablet Take 1 tablet by mouth Daily.      LORazepam (ATIVAN) 0.5 MG tablet Take 1 tablet by mouth Every 8 (Eight) Hours As Needed for Anxiety.      rosuvastatin (CRESTOR) 5 MG tablet Take 1 tablet by mouth Every Night.      sertraline (ZOLOFT) 100 MG tablet Take 1 tablet by mouth Every Night.       No current  "facility-administered medications on file prior to visit.       Objective   Pulse 69   Ht 172.7 cm (68\")   Wt 130 kg (286 lb)   BMI 43.49 kg/m²     Foot/Ankle Exam  Physical Exam  GENERAL  Appearance:  appears stated age and obese  Orientation:  AAOx3  Affect:  appropriate  Gait:  unimpaired  Assistance:  independent  Right shoe gear: casual shoe  Left shoe gear: casual shoe     VASCULAR      Right Foot Vascularity   Normal vascular exam    Dorsalis pedis:  2+  Posterior tibial:  2+  Skin temperature:  warm  Edema grading:  None  CFT:  < 3 seconds  Pedal hair growth:  Present  Varicosities:  none      Left Foot Vascularity   Normal vascular exam    Dorsalis pedis:  2+  Posterior tibial:  2+  Skin temperature:  warm  Edema grading:  None  CFT:  < 3 seconds  Pedal hair growth:  Present  Varicosities:  none     NEUROLOGIC      Right Foot Neurologic   Normal sensation    Light touch sensation: normal  Vibratory sensation: normal  Hot/Cold sensation: normal  Normal reflexes    Achilles reflex:  2+  Babinski reflex:  2+      Left Foot Neurologic   Normal sensation    Light touch sensation: normal  Vibratory sensation: normal  Hot/Cold sensation:  normal  Normal reflexes    Achilles reflex:  2+  Babinski reflex:  2+     MUSCULOSKELETAL      Right Foot Musculoskeletal   Ecchymosis:  none  Arch:  Normal      Left Foot Musculoskeletal   Ecchymosis:  none  Arch:  Normal     MUSCLE STRENGTH      Right Foot Muscle Strength   Normal strength    Foot dorsiflexion:  5  Foot plantar flexion:  5  Foot inversion:  5  Foot eversion:  5      Left Foot Muscle Strength   Normal strength    Foot dorsiflexion:  5  Foot plantar flexion:  5  Foot inversion:  5  Foot eversion:  5     RANGE OF MOTION      Right Foot Range of Motion   Foot and ankle ROM within normal limits        Left Foot Range of Motion   Foot and ankle ROM within normal limits       DERMATOLOGIC       Right Foot Dermatologic   Skin  Right foot skin is intact.       Left " Foot Dermatologic   Skin  Left foot skin is intact.      Right foot additional comments: Incision sites are dry and stable with intact sutures and staples. No evidence of dehiscence or infection. No significant pain or limitation. Minimal swelling to the retrocalcaneal region.     4/23/24: No significant discomfort with palpation involving the posterior aspect of the heel.  Moderate tightness and discomfort involving the operative site.  No progressive deformity.  Strength is improving to the posterior aspect of the leg.  No significant calf pain     6/25/24: No significant discomfort involving the calf region.  Moderate discomfort involving the posterior medial aspect of the calcaneus with moderate swelling involving the retrocalcaneal bursa.  Achilles tendon appears to be intact with mild hypertrophy     8/8/24: Discomfort with palpation involving the posterior medial aspect of the heel.  Moderate swelling.  No obvious defect involving the Achilles tendon.  No progressive deformity or instability.  No prominent residual equinus contracture.     9/19/24: Pain with palpation to the posterior medial aspect of the calcaneus.  Continued swelling and discomfort.  No christin deficit.    2/4/25: Continued pain with palpation involving the posterior aspect of the calcaneus and Achilles tendon insertion with significant Achilles tendon hypertrophy.  No christin deficit appreciated.      Results  Imaging  MRI shows partial thickness tearing of the ventral medial aspect of the distal tendon in the right leg.    Assessment & Plan   Diagnoses and all orders for this visit:    1. Chronic pain of right ankle (Primary)    2. Achilles tendinitis, right leg  -     Case Request; Standing  -     CBC (No Diff); Future  -     Basic Metabolic Panel; Future  -     XR Chest 2 View; Future  -     ECG 12 Lead; Future  -     ceFAZolin (ANCEF) 3,000 mg in sodium chloride 0.9 % 100 mL IVPB  -     Case Request    3. Acquired posterior equinus,  right  -     Case Request; Standing  -     CBC (No Diff); Future  -     Basic Metabolic Panel; Future  -     XR Chest 2 View; Future  -     ECG 12 Lead; Future  -     ceFAZolin (ANCEF) 3,000 mg in sodium chloride 0.9 % 100 mL IVPB  -     Case Request    4. Retrocalcaneal bursitis, right  -     Case Request; Standing  -     CBC (No Diff); Future  -     Basic Metabolic Panel; Future  -     XR Chest 2 View; Future  -     ECG 12 Lead; Future  -     ceFAZolin (ANCEF) 3,000 mg in sodium chloride 0.9 % 100 mL IVPB  -     Case Request    5. Morbid obesity with BMI of 40.0-44.9, adult    Other orders  -     Follow Anesthesia Guidelines / Protocol; Future  -     Follow Anesthesia Guidelines / Protocol; Standing  -     Verify / Perform Chlorhexidine Skin Prep; Standing  -     Provide NPO Instructions to Patient; Future  -     Chlorhexidine Skin Prep; Future  -     Place Sequential Compression Device; Standing  -     Maintain Sequential Compression Device; Standing      Assessment & Plan    Patient returns for MRI review and further planning.  She continues to have prominent pain involving the posterior aspect of the right heel.  She also has significant hypertrophy involving the Achilles tendon attachment.  MRI was reviewed showing partial-thickness tearing involving the insertion of the Achilles tendon.  Given her persistent pain and limitation, I do feel that the only option is to consider surgical management.  Patient previously underwent gastrocnemius recession and Romi's removal in March of last year.  I do feel that she would require Achilles tendon detachment, spur removal, and repair.  I reviewed the procedures, risk, goals, and recovery with her at length.  She understands that she will require nonweightbearing and decreased overall activity after surgery.  I also feel that this will require time off work.  We did discuss driving restrictions.  Patient understands that she may continue to deal with discomfort and  pain despite surgery.  She is tired of dealing with these issues and would like to proceed with surgery.  We will plan for the near future.  I have asked that she call with any additional issues or concerns.  Greater than 30 minutes was spent before, during, and after evaluation for patient care.             Patient or patient representative verbalized consent for the use of Ambient Listening during the visit with  MARY Emanuel DPM for chart documentation. 2/5/2025  06:20 EST    MARY Emanuel DPM

## 2025-02-17 RX ORDER — LEVOTHYROXINE SODIUM 112 UG/1
112 TABLET ORAL DAILY
COMMUNITY
Start: 2024-11-14 | End: 2025-02-25

## 2025-02-28 ENCOUNTER — HOSPITAL ENCOUNTER (OUTPATIENT)
Dept: GENERAL RADIOLOGY | Facility: HOSPITAL | Age: 55
Discharge: HOME | End: 2025-02-28
Payer: COMMERCIAL

## 2025-02-28 ENCOUNTER — HOSPITAL ENCOUNTER (OUTPATIENT)
Dept: CARDIOLOGY | Facility: HOSPITAL | Age: 55
Discharge: HOME | End: 2025-02-28
Payer: COMMERCIAL

## 2025-02-28 ENCOUNTER — LAB (OUTPATIENT)
Dept: LAB | Facility: HOSPITAL | Age: 55
End: 2025-02-28
Payer: COMMERCIAL

## 2025-02-28 DIAGNOSIS — M21.861 ACQUIRED POSTERIOR EQUINUS, RIGHT: ICD-10-CM

## 2025-02-28 DIAGNOSIS — M76.61 ACHILLES TENDINITIS, RIGHT LEG: ICD-10-CM

## 2025-02-28 DIAGNOSIS — M77.51 RETROCALCANEAL BURSITIS, RIGHT: ICD-10-CM

## 2025-02-28 LAB
ANION GAP SERPL CALCULATED.3IONS-SCNC: 10.9 MMOL/L (ref 5–15)
BUN SERPL-MCNC: 15 MG/DL (ref 6–20)
BUN/CREAT SERPL: 19.7 (ref 7–25)
CALCIUM SPEC-SCNC: 9.4 MG/DL (ref 8.6–10.5)
CHLORIDE SERPL-SCNC: 106 MMOL/L (ref 98–107)
CO2 SERPL-SCNC: 27.1 MMOL/L (ref 22–29)
CREAT SERPL-MCNC: 0.76 MG/DL (ref 0.57–1)
DEPRECATED RDW RBC AUTO: 41.7 FL (ref 37–54)
EGFRCR SERPLBLD CKD-EPI 2021: 93.3 ML/MIN/1.73
ERYTHROCYTE [DISTWIDTH] IN BLOOD BY AUTOMATED COUNT: 12.7 % (ref 12.3–15.4)
GLUCOSE SERPL-MCNC: 69 MG/DL (ref 65–99)
HBA1C MFR BLD: 5.46 % (ref 4.8–5.6)
HCT VFR BLD AUTO: 41.6 % (ref 34–46.6)
HGB BLD-MCNC: 13.3 G/DL (ref 12–15.9)
MCH RBC QN AUTO: 28.7 PG (ref 26.6–33)
MCHC RBC AUTO-ENTMCNC: 32 G/DL (ref 31.5–35.7)
MCV RBC AUTO: 89.7 FL (ref 79–97)
PLATELET # BLD AUTO: 280 10*3/MM3 (ref 140–450)
PMV BLD AUTO: 10.3 FL (ref 6–12)
POTASSIUM SERPL-SCNC: 4.3 MMOL/L (ref 3.5–5.2)
RBC # BLD AUTO: 4.64 10*6/MM3 (ref 3.77–5.28)
SODIUM SERPL-SCNC: 144 MMOL/L (ref 136–145)
WBC NRBC COR # BLD AUTO: 7.86 10*3/MM3 (ref 3.4–10.8)

## 2025-02-28 PROCEDURE — 80048 BASIC METABOLIC PNL TOTAL CA: CPT

## 2025-02-28 PROCEDURE — 93005 ELECTROCARDIOGRAM TRACING: CPT | Performed by: PODIATRIST

## 2025-02-28 PROCEDURE — 71046 X-RAY EXAM CHEST 2 VIEWS: CPT

## 2025-02-28 PROCEDURE — 83036 HEMOGLOBIN GLYCOSYLATED A1C: CPT

## 2025-02-28 PROCEDURE — 36415 COLL VENOUS BLD VENIPUNCTURE: CPT

## 2025-02-28 PROCEDURE — 85027 COMPLETE CBC AUTOMATED: CPT

## 2025-03-03 LAB
QT INTERVAL: 418 MS
QTC INTERVAL: 423 MS

## 2025-03-06 ENCOUNTER — TELEPHONE (OUTPATIENT)
Age: 55
End: 2025-03-06
Payer: COMMERCIAL

## 2025-03-10 ENCOUNTER — ANESTHESIA (OUTPATIENT)
Dept: PERIOP | Facility: HOSPITAL | Age: 55
End: 2025-03-10
Payer: COMMERCIAL

## 2025-03-10 ENCOUNTER — ANESTHESIA EVENT (OUTPATIENT)
Dept: PERIOP | Facility: HOSPITAL | Age: 55
End: 2025-03-10
Payer: COMMERCIAL

## 2025-03-10 ENCOUNTER — HOSPITAL ENCOUNTER (OUTPATIENT)
Facility: HOSPITAL | Age: 55
Setting detail: HOSPITAL OUTPATIENT SURGERY
Discharge: HOME | End: 2025-03-10
Attending: PODIATRIST | Admitting: PODIATRIST
Payer: COMMERCIAL

## 2025-03-10 VITALS
WEIGHT: 257 LBS | BODY MASS INDEX: 38.95 KG/M2 | SYSTOLIC BLOOD PRESSURE: 96 MMHG | DIASTOLIC BLOOD PRESSURE: 58 MMHG | OXYGEN SATURATION: 95 % | RESPIRATION RATE: 20 BRPM | TEMPERATURE: 98.1 F | HEART RATE: 66 BPM | HEIGHT: 68 IN

## 2025-03-10 DIAGNOSIS — M77.51 RETROCALCANEAL BURSITIS, RIGHT: ICD-10-CM

## 2025-03-10 DIAGNOSIS — M92.61 HAGLUND'S DEFORMITY, RIGHT: Primary | ICD-10-CM

## 2025-03-10 DIAGNOSIS — M76.61 ACHILLES TENDINITIS, RIGHT LEG: ICD-10-CM

## 2025-03-10 DIAGNOSIS — M21.861 ACQUIRED POSTERIOR EQUINUS, RIGHT: ICD-10-CM

## 2025-03-10 PROCEDURE — 25010000002 SUGAMMADEX 200 MG/2ML SOLUTION

## 2025-03-10 PROCEDURE — 25810000003 LACTATED RINGERS PER 1000 ML: Performed by: PODIATRIST

## 2025-03-10 PROCEDURE — 25010000002 DEXAMETHASONE PER 1 MG

## 2025-03-10 PROCEDURE — 25810000003 LACTATED RINGERS PER 1000 ML

## 2025-03-10 PROCEDURE — 25010000002 FENTANYL CITRATE (PF) 100 MCG/2ML SOLUTION

## 2025-03-10 PROCEDURE — 25010000002 ROPIVACAINE PER 1 MG: Performed by: ANESTHESIOLOGY

## 2025-03-10 PROCEDURE — 25010000002 ONDANSETRON PER 1 MG

## 2025-03-10 PROCEDURE — 25010000002 PROPOFOL 10 MG/ML EMULSION

## 2025-03-10 PROCEDURE — 25010000002 MIDAZOLAM PER 1 MG: Performed by: ANESTHESIOLOGY

## 2025-03-10 PROCEDURE — C1713 ANCHOR/SCREW BN/BN,TIS/BN: HCPCS | Performed by: PODIATRIST

## 2025-03-10 PROCEDURE — 25010000002 SUCCINYLCHOLINE PER 20 MG

## 2025-03-10 PROCEDURE — 25010000002 CEFAZOLIN 3 G RECONSTITUTED SOLUTION 1 EACH VIAL: Performed by: PODIATRIST

## 2025-03-10 PROCEDURE — 25010000002 LIDOCAINE PF 1% 1 % SOLUTION

## 2025-03-10 DEVICE — SYS SUT/ANCH ACHILLES SPEEDBRIDGE BIOCOMP W/JUMPSTART: Type: IMPLANTABLE DEVICE | Site: ACHILLES TENDON | Status: FUNCTIONAL

## 2025-03-10 RX ORDER — DEXAMETHASONE SODIUM PHOSPHATE 4 MG/ML
INJECTION, SOLUTION INTRA-ARTICULAR; INTRALESIONAL; INTRAMUSCULAR; INTRAVENOUS; SOFT TISSUE AS NEEDED
Status: DISCONTINUED | OUTPATIENT
Start: 2025-03-10 | End: 2025-03-10 | Stop reason: SURG

## 2025-03-10 RX ORDER — PROPOFOL 10 MG/ML
VIAL (ML) INTRAVENOUS AS NEEDED
Status: DISCONTINUED | OUTPATIENT
Start: 2025-03-10 | End: 2025-03-10 | Stop reason: SURG

## 2025-03-10 RX ORDER — LABETALOL HYDROCHLORIDE 5 MG/ML
5 INJECTION, SOLUTION INTRAVENOUS
Status: DISCONTINUED | OUTPATIENT
Start: 2025-03-10 | End: 2025-03-10 | Stop reason: HOSPADM

## 2025-03-10 RX ORDER — OXYCODONE HYDROCHLORIDE 5 MG/1
5 TABLET ORAL ONCE AS NEEDED
Status: DISCONTINUED | OUTPATIENT
Start: 2025-03-10 | End: 2025-03-10 | Stop reason: HOSPADM

## 2025-03-10 RX ORDER — LIDOCAINE HYDROCHLORIDE 10 MG/ML
0.5 INJECTION, SOLUTION EPIDURAL; INFILTRATION; INTRACAUDAL; PERINEURAL ONCE AS NEEDED
Status: DISCONTINUED | OUTPATIENT
Start: 2025-03-10 | End: 2025-03-10 | Stop reason: HOSPADM

## 2025-03-10 RX ORDER — SODIUM CHLORIDE, SODIUM LACTATE, POTASSIUM CHLORIDE, CALCIUM CHLORIDE 600; 310; 30; 20 MG/100ML; MG/100ML; MG/100ML; MG/100ML
1000 INJECTION, SOLUTION INTRAVENOUS ONCE
Status: COMPLETED | OUTPATIENT
Start: 2025-03-10 | End: 2025-03-10

## 2025-03-10 RX ORDER — SODIUM CHLORIDE 0.9 % (FLUSH) 0.9 %
10 SYRINGE (ML) INJECTION AS NEEDED
Status: DISCONTINUED | OUTPATIENT
Start: 2025-03-10 | End: 2025-03-10 | Stop reason: HOSPADM

## 2025-03-10 RX ORDER — EPHEDRINE SULFATE 5 MG/ML
INJECTION INTRAVENOUS AS NEEDED
Status: DISCONTINUED | OUTPATIENT
Start: 2025-03-10 | End: 2025-03-10 | Stop reason: SURG

## 2025-03-10 RX ORDER — MIDAZOLAM HYDROCHLORIDE 1 MG/ML
INJECTION, SOLUTION INTRAMUSCULAR; INTRAVENOUS
Status: COMPLETED | OUTPATIENT
Start: 2025-03-10 | End: 2025-03-10

## 2025-03-10 RX ORDER — ONDANSETRON 2 MG/ML
INJECTION INTRAMUSCULAR; INTRAVENOUS AS NEEDED
Status: DISCONTINUED | OUTPATIENT
Start: 2025-03-10 | End: 2025-03-10 | Stop reason: SURG

## 2025-03-10 RX ORDER — ACETAMINOPHEN 325 MG/1
650 TABLET ORAL ONCE AS NEEDED
Status: DISCONTINUED | OUTPATIENT
Start: 2025-03-10 | End: 2025-03-10 | Stop reason: HOSPADM

## 2025-03-10 RX ORDER — ALBUTEROL SULFATE 0.83 MG/ML
2.5 SOLUTION RESPIRATORY (INHALATION) ONCE AS NEEDED
Status: DISCONTINUED | OUTPATIENT
Start: 2025-03-10 | End: 2025-03-10 | Stop reason: HOSPADM

## 2025-03-10 RX ORDER — SCOPOLAMINE 1 MG/3D
1 PATCH, EXTENDED RELEASE TRANSDERMAL ONCE
Status: DISCONTINUED | OUTPATIENT
Start: 2025-03-10 | End: 2025-03-10 | Stop reason: SDUPTHER

## 2025-03-10 RX ORDER — SODIUM CHLORIDE, SODIUM LACTATE, POTASSIUM CHLORIDE, CALCIUM CHLORIDE 600; 310; 30; 20 MG/100ML; MG/100ML; MG/100ML; MG/100ML
INJECTION, SOLUTION INTRAVENOUS CONTINUOUS PRN
Status: DISCONTINUED | OUTPATIENT
Start: 2025-03-10 | End: 2025-03-10 | Stop reason: SURG

## 2025-03-10 RX ORDER — HYDROCODONE BITARTRATE AND ACETAMINOPHEN 7.5; 325 MG/1; MG/1
1 TABLET ORAL EVERY 6 HOURS PRN
Qty: 28 TABLET | Refills: 0 | Status: SHIPPED | OUTPATIENT
Start: 2025-03-10

## 2025-03-10 RX ORDER — NALOXONE HCL 0.4 MG/ML
0.4 VIAL (ML) INJECTION AS NEEDED
Status: DISCONTINUED | OUTPATIENT
Start: 2025-03-10 | End: 2025-03-10 | Stop reason: HOSPADM

## 2025-03-10 RX ORDER — DEXMEDETOMIDINE HYDROCHLORIDE 100 UG/ML
INJECTION, SOLUTION INTRAVENOUS
Status: COMPLETED | OUTPATIENT
Start: 2025-03-10 | End: 2025-03-10

## 2025-03-10 RX ORDER — ROCURONIUM BROMIDE 10 MG/ML
INJECTION, SOLUTION INTRAVENOUS AS NEEDED
Status: DISCONTINUED | OUTPATIENT
Start: 2025-03-10 | End: 2025-03-10 | Stop reason: SURG

## 2025-03-10 RX ORDER — ROPIVACAINE HYDROCHLORIDE 5 MG/ML
INJECTION, SOLUTION EPIDURAL; INFILTRATION; PERINEURAL
Status: COMPLETED | OUTPATIENT
Start: 2025-03-10 | End: 2025-03-10

## 2025-03-10 RX ORDER — ONDANSETRON 2 MG/ML
4 INJECTION INTRAMUSCULAR; INTRAVENOUS ONCE AS NEEDED
Status: DISCONTINUED | OUTPATIENT
Start: 2025-03-10 | End: 2025-03-10 | Stop reason: HOSPADM

## 2025-03-10 RX ORDER — FENTANYL CITRATE 50 UG/ML
25 INJECTION, SOLUTION INTRAMUSCULAR; INTRAVENOUS
Status: DISCONTINUED | OUTPATIENT
Start: 2025-03-10 | End: 2025-03-10 | Stop reason: HOSPADM

## 2025-03-10 RX ORDER — HYDRALAZINE HYDROCHLORIDE 20 MG/ML
5 INJECTION INTRAMUSCULAR; INTRAVENOUS
Status: DISCONTINUED | OUTPATIENT
Start: 2025-03-10 | End: 2025-03-10 | Stop reason: HOSPADM

## 2025-03-10 RX ORDER — SUCCINYLCHOLINE CHLORIDE 20 MG/ML
INJECTION INTRAMUSCULAR; INTRAVENOUS AS NEEDED
Status: DISCONTINUED | OUTPATIENT
Start: 2025-03-10 | End: 2025-03-10 | Stop reason: SURG

## 2025-03-10 RX ORDER — FENTANYL CITRATE 50 UG/ML
50 INJECTION, SOLUTION INTRAMUSCULAR; INTRAVENOUS
Status: DISCONTINUED | OUTPATIENT
Start: 2025-03-10 | End: 2025-03-10 | Stop reason: HOSPADM

## 2025-03-10 RX ORDER — DIPHENHYDRAMINE HYDROCHLORIDE 50 MG/ML
12.5 INJECTION INTRAMUSCULAR; INTRAVENOUS
Status: DISCONTINUED | OUTPATIENT
Start: 2025-03-10 | End: 2025-03-10 | Stop reason: HOSPADM

## 2025-03-10 RX ORDER — SCOPOLAMINE 1 MG/3D
1 PATCH, EXTENDED RELEASE TRANSDERMAL
Status: DISCONTINUED | OUTPATIENT
Start: 2025-03-10 | End: 2025-03-10 | Stop reason: HOSPADM

## 2025-03-10 RX ORDER — LIDOCAINE HYDROCHLORIDE 10 MG/ML
INJECTION, SOLUTION EPIDURAL; INFILTRATION; INTRACAUDAL; PERINEURAL AS NEEDED
Status: DISCONTINUED | OUTPATIENT
Start: 2025-03-10 | End: 2025-03-10 | Stop reason: SURG

## 2025-03-10 RX ORDER — FENTANYL CITRATE 50 UG/ML
INJECTION, SOLUTION INTRAMUSCULAR; INTRAVENOUS AS NEEDED
Status: DISCONTINUED | OUTPATIENT
Start: 2025-03-10 | End: 2025-03-10 | Stop reason: SURG

## 2025-03-10 RX ADMIN — DEXMEDETOMIDINE HYDROCHLORIDE 20 MCG: 100 INJECTION, SOLUTION INTRAVENOUS at 11:36

## 2025-03-10 RX ADMIN — SODIUM CHLORIDE, SODIUM LACTATE, POTASSIUM CHLORIDE, AND CALCIUM CHLORIDE: .6; .31; .03; .02 INJECTION, SOLUTION INTRAVENOUS at 12:06

## 2025-03-10 RX ADMIN — FENTANYL CITRATE 50 MCG: 50 INJECTION, SOLUTION INTRAMUSCULAR; INTRAVENOUS at 12:12

## 2025-03-10 RX ADMIN — SUCCINYLCHOLINE CHLORIDE 140 MG: 20 INJECTION, SOLUTION INTRAMUSCULAR; INTRAVENOUS at 12:12

## 2025-03-10 RX ADMIN — ROCURONIUM BROMIDE 5 MG: 10 INJECTION, SOLUTION INTRAVENOUS at 12:12

## 2025-03-10 RX ADMIN — ROPIVACAINE HYDROCHLORIDE 30 ML: 5 INJECTION EPIDURAL; INFILTRATION; PERINEURAL at 11:36

## 2025-03-10 RX ADMIN — PROPOFOL 200 MG: 10 INJECTION, EMULSION INTRAVENOUS at 12:12

## 2025-03-10 RX ADMIN — ROCURONIUM BROMIDE 45 MG: 10 INJECTION, SOLUTION INTRAVENOUS at 12:17

## 2025-03-10 RX ADMIN — SCOPOLAMINE 1 PATCH: 1.5 PATCH, EXTENDED RELEASE TRANSDERMAL at 10:14

## 2025-03-10 RX ADMIN — SODIUM CHLORIDE 3000 MG: 900 INJECTION INTRAVENOUS at 12:00

## 2025-03-10 RX ADMIN — MIDAZOLAM 2 MG: 1 INJECTION INTRAMUSCULAR; INTRAVENOUS at 11:30

## 2025-03-10 RX ADMIN — LIDOCAINE HYDROCHLORIDE 50 MG: 10 INJECTION, SOLUTION EPIDURAL; INFILTRATION; INTRACAUDAL; PERINEURAL at 12:12

## 2025-03-10 RX ADMIN — FENTANYL CITRATE 50 MCG: 50 INJECTION, SOLUTION INTRAMUSCULAR; INTRAVENOUS at 12:36

## 2025-03-10 RX ADMIN — EPHEDRINE SULFATE 5 MG: 5 INJECTION INTRAVENOUS at 12:30

## 2025-03-10 RX ADMIN — PROPOFOL 150 MCG/KG/MIN: 10 INJECTION, EMULSION INTRAVENOUS at 12:13

## 2025-03-10 RX ADMIN — SUGAMMADEX 233 MG: 100 INJECTION, SOLUTION INTRAVENOUS at 13:52

## 2025-03-10 RX ADMIN — SODIUM CHLORIDE, SODIUM LACTATE, POTASSIUM CHLORIDE, AND CALCIUM CHLORIDE 1000 ML: 600; 310; 30; 20 INJECTION, SOLUTION INTRAVENOUS at 09:53

## 2025-03-10 RX ADMIN — DEXAMETHASONE SODIUM PHOSPHATE 8 MG: 4 INJECTION, SOLUTION INTRAMUSCULAR; INTRAVENOUS at 12:16

## 2025-03-10 RX ADMIN — ONDANSETRON 4 MG: 2 INJECTION, SOLUTION INTRAMUSCULAR; INTRAVENOUS at 13:21

## 2025-03-10 RX ADMIN — ROCURONIUM BROMIDE 10 MG: 10 INJECTION, SOLUTION INTRAVENOUS at 13:16

## 2025-03-10 NOTE — ANESTHESIA PROCEDURE NOTES
Airway  Reason: elective    Date/Time: 3/10/2025 12:13 PM  Airway not difficult    General Information and Staff    Patient location during procedure: OR  CRNA/CAA: Angelique Decker CRNA    Indications and Patient Condition  Indications for airway management: airway protection    Preoxygenated: yes  MILS not maintained throughout    Mask difficulty assessment: 0 - not attempted    Final Airway Details    Final airway type: endotracheal airway      Successful airway: ETT  Cuffed: yes   Successful intubation technique: video laryngoscopy  Adjuncts used in placement: intubating stylet  Endotracheal tube insertion site: oral  Blade: Robertson  Blade size: 3  ETT size (mm): 7.0  Cormack-Lehane Classification: grade I - full view of glottis  Placement verified by: chest auscultation and capnometry   Measured from: lips  ETT/EBT  to lips (cm): 23  Number of attempts at approach: 1  Assessment: lips, teeth, and gum same as pre-op and atraumatic intubation

## 2025-03-10 NOTE — ANESTHESIA PROCEDURE NOTES
Peripheral Block      Patient reassessed immediately prior to procedure    Patient location during procedure: pre-op  Start time: 3/10/2025 11:30 AM  Stop time: 3/10/2025 11:36 AM  Reason for block: at surgeon's request and post-op pain management  Performed by  Anesthesiologist: Mable Araiza MD  Preanesthetic Checklist  Completed: patient identified, IV checked, site marked, risks and benefits discussed, surgical consent, monitors and equipment checked, pre-op evaluation and timeout performed  Prep:  Pt Position: supine  Sterile barriers:alcohol skin prep, cap, gloves, mask and washed/disinfected hands  Prep: ChloraPrep  Patient monitoring: blood pressure monitoring, continuous pulse oximetry and EKG  Procedure    Sedation: yes  Performed under: local infiltration  Guidance:ultrasound guided    ULTRASOUND INTERPRETATION.  Using ultrasound guidance a 20 G gauge needle was placed in close proximity to the nerve, at which point, under ultrasound guidance anesthetic was injected in the area of the nerve and spread of the anesthesia was seen on ultrasound in close proximity thereto.  There were no abnormalities seen on ultrasound; a digital image was taken; and the patient tolerated the procedure with no complications. Images:still images obtained, printed/placed on chart    Laterality:right  Block Type:popliteal  Injection Technique:single-shot  Needle Type:echogenic  Needle Gauge:20 G    Sedation medications used: midazolam (VERSED) injection - Intravenous   2 mg - 3/10/2025 11:30:00 AM  Medications Used: dexmedetomidine HCl (PRECEDEX) injection - Perineural   20 mcg - 3/10/2025 11:36:00 AM  ropivacaine (NAROPIN) 0.5 % injection - Perineural   30 mL - 3/10/2025 11:36:00 AM      Post Assessment  Injection Assessment: negative aspiration for heme, no paresthesia on injection and incremental injection  Patient Tolerance:comfortable throughout block  Complications:no  Additional Notes  Skin anesthetized with 1%  Lidocaine.  Using 20 G, 4 inch stimuplex echogenic needle,  Local was injected with serial aspirations under continuous ultrasound guidance to surround sciatic nerve at bifurcation in popliteal fosas.  No heme aspirated.  Needle tip visualized throughout.  Good spread noted.  No complications.  No bleeding noted.  Performed by: Mable Araiza MD

## 2025-03-10 NOTE — ANESTHESIA POSTPROCEDURE EVALUATION
Patient: Alfreda Mcgowan    Procedure Summary       Date: 03/10/25 Room / Location: [facility] OR 09 / [facility] MAIN OR    Anesthesia Start: 1206 Anesthesia Stop: 1404    Procedure: Calcaneal spur removal with Achilles tendon repair to the right lower extremity (Right: Ankle) Diagnosis:       Achilles tendinitis, right leg      Acquired posterior equinus, right      Retrocalcaneal bursitis, right      (Achilles tendinitis, right leg [M76.61])      (Acquired posterior equinus, right [M21.861])      (Retrocalcaneal bursitis, right [M77.51])    Surgeons: MARY Emanuel DPM Provider: Jonathon Rutledge MD    Anesthesia Type: general with block ASA Status: 3            Anesthesia Type: general with block    Vitals  Vitals Value Taken Time   BP 89/48 03/10/25 15:38   Temp 97.5 °F (36.4 °C) 03/10/25 14:53   Pulse 59 03/10/25 15:38   Resp 17 03/10/25 15:36   SpO2 92 % 03/10/25 15:38   Vitals shown include unfiled device data.        Post Anesthesia Care and Evaluation    Patient location during evaluation: PACU  Patient participation: complete - patient participated  Level of consciousness: awake  Pain scale: See nurse's notes for pain score.  Pain management: adequate    Airway patency: patent  Anesthetic complications: No anesthetic complications  PONV Status: none  Cardiovascular status: acceptable  Respiratory status: acceptable and spontaneous ventilation  Hydration status: acceptable    Comments: Patient seen and examined postoperatively; vital signs stable; SpO2 greater than or equal to 90%; cardiopulmonary status stable; nausea/vomiting adequately controlled; pain adequately controlled; no apparent anesthesia complications; patient discharged from anesthesia care when discharge criteria were met

## 2025-03-10 NOTE — ANESTHESIA PREPROCEDURE EVALUATION
Anesthesia Evaluation     Patient summary reviewed and Nursing notes reviewed   history of anesthetic complications:  PONV  NPO Solid Status: > 8 hours  NPO Liquid Status: > 8 hours           Airway   Mallampati: II  TM distance: >3 FB  Neck ROM: full  Dental - normal exam     Pulmonary - normal exam   (-) not a smoker  Cardiovascular - normal exam    (+) hypertension, hyperlipidemia  (-) angina      Neuro/Psych  GI/Hepatic/Renal/Endo    (+) obesity, morbid obesity, thyroid problem hypothyroidism    Musculoskeletal (-) negative ROS    Abdominal    Substance History - negative use     OB/GYN          Other - negative ROS                     Anesthesia Plan    ASA 3     general with block     intravenous induction     Anesthetic plan, risks, benefits, and alternatives have been provided, discussed and informed consent has been obtained with: patient.    Plan discussed with CRNA and CAA.    CODE STATUS:

## 2025-03-10 NOTE — H&P
03/10/25   Foot and Ankle Surgery - Pre-Op H&P  Provider: Dr. Donald Emanuel DPM  Location: [facility]    Subjective:  Alfreda Mcgowan is a 54 y.o. female.     CC: Right foot pain    HPI: Patient presents for surgery involving the right foot.  She continues to have pain and limitation.  No progressive deformity or instability.    No Known Allergies    Past Medical History:   Diagnosis Date    Difficulty walking     Disease of thyroid gland     Hyperlipidemia     Hypertension     Plantar fasciitis     PONV (postoperative nausea and vomiting)     Stress fracture        Past Surgical History:   Procedure Laterality Date    CALCANEAL SPUR EXCISION Right 3/8/2024    Procedure: VICENTA REMOVAL;  Surgeon: MARY Emanuel DPM;  Location: [facility] OR;  Service: Podiatry;  Laterality: Right;    COLONOSCOPY      woke vomiting    HYSTERECTOMY      partial    RECESSION GASTROCNEMIUS Right 3/8/2024    Procedure: RECESSION GASTROCNEMIUS (30 MIN TOTAL);  Surgeon: MARY Emanuel DPM;  Location: [facility] OR;  Service: Podiatry;  Laterality: Right;    TONSILLECTOMY  2002       Family History   Problem Relation Age of Onset    Cancer Mother         Lung cancer    Heart disease Mother     Hypertension Mother     Cancer Father         Colon    Cancer Sister         Lung    Cancer Maternal Aunt         Breast    Cancer Sister         Breast       Social History     Socioeconomic History    Marital status:    Tobacco Use    Smoking status: Never     Passive exposure: Never    Smokeless tobacco: Never   Vaping Use    Vaping status: Never Used   Substance and Sexual Activity    Alcohol use: Yes     Alcohol/week: 4.0 standard drinks of alcohol     Types: 2 Glasses of wine, 2 Standard drinks or equivalent per week    Drug use: Never    Sexual activity: Yes     Partners: Male     Birth control/protection: Vasectomy, Hysterectomy          Current Facility-Administered Medications:     ceFAZolin 3000 mg IVPB in 100 mL NS  "(MBP), 3,000 mg, Intravenous, Once, MARY Emanuel DPM    lidocaine PF 1% (XYLOCAINE) injection 0.5 mL, 0.5 mL, Intradermal, Once PRN, MARY Emanuel DPM    scopolamine patch 1 mg/72 hr, 1 patch, Transdermal, Q72H, Mable Araiza MD, 1 patch at 03/10/25 1014    sodium chloride 0.9 % flush 10 mL, 10 mL, Intravenous, PRN, MARY Emanuel DPM    Review of Systems:  General: Denies fever, chills, fatigue, and weakness.  Eyes: Denies vision loss, blurry vision, and excessive redness.  ENT: Denies hearing issues and difficulty swallowing.  Cardiovascular: Denies palpitations, chest pain, or syncopal episodes.  Respiratory: Denies shortness of breath, wheezing, and coughing.  GI: Denies abdominal pain, nausea, and vomiting.   : Denies frequency, hematuria, and urgency.  Musculoskeletal: + right heel pain  Derm: Denies rash, open wounds, or suspicious lesions.  Neuro: Denies headaches, numbness, loss of coordination, and tremors.  Psych: Denies anxiety and depression.  Endocrine: Denies temperature intolerance and changes in appetite.  Heme: Denies bleeding disorders or abnormal bruising.     Objective   /80   Pulse 60   Temp 98 °F (36.7 °C)   Resp 24   Ht 172.7 cm (68\")   Wt 117 kg (257 lb)   SpO2 98%   BMI 39.08 kg/m²     Foot/Ankle Exam        GENERAL  Appearance:  appears stated age and obese  Orientation:  AAOx3  Affect:  appropriate  Gait:  unimpaired  Assistance:  independent  Right shoe gear: casual shoe  Left shoe gear: casual shoe     VASCULAR      Right Foot Vascularity   Normal vascular exam    Dorsalis pedis:  2+  Posterior tibial:  2+  Skin temperature:  warm  Edema grading:  None  CFT:  < 3 seconds  Pedal hair growth:  Present  Varicosities:  none      Left Foot Vascularity   Normal vascular exam    Dorsalis pedis:  2+  Posterior tibial:  2+  Skin temperature:  warm  Edema grading:  None  CFT:  < 3 seconds  Pedal hair growth:  Present  Varicosities:  none     NEUROLOGIC      Right " Foot Neurologic   Normal sensation    Light touch sensation: normal  Vibratory sensation: normal  Hot/Cold sensation: normal  Normal reflexes    Achilles reflex:  2+  Babinski reflex:  2+      Left Foot Neurologic   Normal sensation    Light touch sensation: normal  Vibratory sensation: normal  Hot/Cold sensation:  normal  Normal reflexes    Achilles reflex:  2+  Babinski reflex:  2+     MUSCULOSKELETAL      Right Foot Musculoskeletal   Ecchymosis:  none  Arch:  Normal      Left Foot Musculoskeletal   Ecchymosis:  none  Arch:  Normal     MUSCLE STRENGTH      Right Foot Muscle Strength   Normal strength    Foot dorsiflexion:  5  Foot plantar flexion:  5  Foot inversion:  5  Foot eversion:  5      Left Foot Muscle Strength   Normal strength    Foot dorsiflexion:  5  Foot plantar flexion:  5  Foot inversion:  5  Foot eversion:  5     RANGE OF MOTION      Right Foot Range of Motion   Foot and ankle ROM within normal limits        Left Foot Range of Motion   Foot and ankle ROM within normal limits       DERMATOLOGIC       Right Foot Dermatologic   Skin  Right foot skin is intact.       Left Foot Dermatologic   Skin  Left foot skin is intact.      Right foot additional comments: Incision sites are dry and stable with intact sutures and staples. No evidence of dehiscence or infection. No significant pain or limitation. Minimal swelling to the retrocalcaneal region.     4/23/24: No significant discomfort with palpation involving the posterior aspect of the heel.  Moderate tightness and discomfort involving the operative site.  No progressive deformity.  Strength is improving to the posterior aspect of the leg.  No significant calf pain     6/25/24: No significant discomfort involving the calf region.  Moderate discomfort involving the posterior medial aspect of the calcaneus with moderate swelling involving the retrocalcaneal bursa.  Achilles tendon appears to be intact with mild hypertrophy     8/8/24: Discomfort with  palpation involving the posterior medial aspect of the heel.  Moderate swelling.  No obvious defect involving the Achilles tendon.  No progressive deformity or instability.  No prominent residual equinus contracture.     9/19/24: Pain with palpation to the posterior medial aspect of the calcaneus.  Continued swelling and discomfort.  No christin deficit.     2/4/25: Continued pain with palpation involving the posterior aspect of the calcaneus and Achilles tendon insertion with significant Achilles tendon hypertrophy.  No christin deficit appreciated.    3/10/25: Continued pain involving the posterior aspect of the right heel.        Assessment & Plan     Retrocalcaneal bursitis, right    Achilles tendinitis, right leg    Acquired posterior equinus, right    Continued pain and limitation involving the right heel.  Will proceed with surgery as planned.  Patient is to remain nonweightbearing after surgery.  Follow-up with me in 2 weeks.    Note is dictated utilizing voice recognition software. Unfortunately this leads to occasional typographical errors. I apologize in advance if the situation occurs. If questions occur please do not hesitate to call our office.

## 2025-03-24 ENCOUNTER — OFFICE VISIT (OUTPATIENT)
Age: 55
End: 2025-03-24
Payer: COMMERCIAL

## 2025-03-24 DIAGNOSIS — G89.29 CHRONIC PAIN OF RIGHT ANKLE: Primary | ICD-10-CM

## 2025-03-24 DIAGNOSIS — E66.01 MORBID OBESITY WITH BMI OF 40.0-44.9, ADULT: ICD-10-CM

## 2025-03-24 DIAGNOSIS — M21.861 ACQUIRED POSTERIOR EQUINUS, RIGHT: ICD-10-CM

## 2025-03-24 DIAGNOSIS — M77.51 RETROCALCANEAL BURSITIS, RIGHT: ICD-10-CM

## 2025-03-24 DIAGNOSIS — M25.571 CHRONIC PAIN OF RIGHT ANKLE: Primary | ICD-10-CM

## 2025-03-24 DIAGNOSIS — M76.61 ACHILLES TENDINITIS, RIGHT LEG: ICD-10-CM

## 2025-03-24 PROCEDURE — 99024 POSTOP FOLLOW-UP VISIT: CPT | Performed by: PODIATRIST

## 2025-03-24 NOTE — PROGRESS NOTES
03/24/2025  Foot and Ankle Surgery - Established Patient/Follow-up  Provider: Dr. Donald Emanuel DPM  Location: [facility]    Subjective:  Alfreda Mcgowan is a 54 y.o. female.     Chief Complaint   Patient presents with    Right Foot - Pain, Post-op     3/10/25 NADIRA  Partial resection of calcaneal bone, right    Achilles tendon repair, right      Post-op     PCP: Aparna Larios MD  Last PCP Visit:  11/18/2024       History of Present Illness  The patient presents for evaluation status post Achilles tendon surgery.    She has been adhering to the prescribed regimen of complete non-weightbearing on the affected limb. She has a boot at home and has brought it with her today. 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 She reports difficulty in maneuvering crutches, which has resulted in two falls within her bathroom. Consequently, she has been utilizing a wheelchair for mobility.    SOCIAL HISTORY  She works at a bank.      No Known Allergies    Current Outpatient Medications on File Prior to Visit   Medication Sig Dispense Refill    baclofen (LIORESAL) 10 MG tablet Take 1 tablet by mouth Daily As Needed for Muscle Spasms.      cetirizine (zyrTEC) 10 MG tablet Take 1 tablet by mouth Daily.      hydroCHLOROthiazide (HYDRODIURIL) 25 MG tablet Take 1 tablet by mouth Daily.      HYDROcodone-acetaminophen (NORCO) 7.5-325 MG per tablet Take 1 tablet by mouth Every 6 (Six) Hours As Needed for Moderate Pain (Pain). 28 tablet 0    levothyroxine (SYNTHROID, LEVOTHROID) 125 MCG tablet Take 1 tablet by mouth Daily.      LORazepam (ATIVAN) 0.5 MG tablet Take 1 tablet by mouth Every 8 (Eight) Hours As Needed for Anxiety.      rosuvastatin (CRESTOR) 5 MG tablet Take 1 tablet by mouth Every Night.      Semaglutide,0.25 or 0.5MG/DOS, (OZEMPIC) 2 MG/3ML solution pen-injector Inject 1 mg under the skin into the appropriate area as directed 1 (One) Time Per Week.      sertraline (ZOLOFT)  100 MG tablet Take 1 tablet by mouth Every Night.       No current facility-administered medications on file prior to visit.       Objective   There were no vitals taken for this visit.    Foot/Ankle Exam  Physical Exam  GENERAL  Appearance:  appears stated age and obese  Orientation:  AAOx3  Affect:  appropriate  Gait:  unimpaired  Assistance:  independent  Right shoe gear: casual shoe  Left shoe gear: casual shoe     VASCULAR      Right Foot Vascularity   Normal vascular exam    Dorsalis pedis:  2+  Posterior tibial:  2+  Skin temperature:  warm  Edema grading:  None  CFT:  < 3 seconds  Pedal hair growth:  Present  Varicosities:  none      Left Foot Vascularity   Normal vascular exam    Dorsalis pedis:  2+  Posterior tibial:  2+  Skin temperature:  warm  Edema grading:  None  CFT:  < 3 seconds  Pedal hair growth:  Present  Varicosities:  none     NEUROLOGIC      Right Foot Neurologic   Normal sensation    Light touch sensation: normal  Vibratory sensation: normal  Hot/Cold sensation: normal  Normal reflexes    Achilles reflex:  2+  Babinski reflex:  2+      Left Foot Neurologic   Normal sensation    Light touch sensation: normal  Vibratory sensation: normal  Hot/Cold sensation:  normal  Normal reflexes    Achilles reflex:  2+  Babinski reflex:  2+     MUSCULOSKELETAL      Right Foot Musculoskeletal   Ecchymosis:  none  Arch:  Normal      Left Foot Musculoskeletal   Ecchymosis:  none  Arch:  Normal     MUSCLE STRENGTH      Right Foot Muscle Strength   Normal strength    Foot dorsiflexion:  5  Foot plantar flexion:  5  Foot inversion:  5  Foot eversion:  5      Left Foot Muscle Strength   Normal strength    Foot dorsiflexion:  5  Foot plantar flexion:  5  Foot inversion:  5  Foot eversion:  5     RANGE OF MOTION      Right Foot Range of Motion   Foot and ankle ROM within normal limits        Left Foot Range of Motion   Foot and ankle ROM within normal limits       DERMATOLOGIC       Right Foot Dermatologic    Skin  Right foot skin is intact.       Left Foot Dermatologic   Skin  Left foot skin is intact.      Right foot additional comments: Incision sites are dry and stable with intact sutures and staples. No evidence of dehiscence or infection. No significant pain or limitation. Minimal swelling to the retrocalcaneal region.     4/23/24: No significant discomfort with palpation involving the posterior aspect of the heel.  Moderate tightness and discomfort involving the operative site.  No progressive deformity.  Strength is improving to the posterior aspect of the leg.  No significant calf pain     6/25/24: No significant discomfort involving the calf region.  Moderate discomfort involving the posterior medial aspect of the calcaneus with moderate swelling involving the retrocalcaneal bursa.  Achilles tendon appears to be intact with mild hypertrophy     8/8/24: Discomfort with palpation involving the posterior medial aspect of the heel.  Moderate swelling.  No obvious defect involving the Achilles tendon.  No progressive deformity or instability.  No prominent residual equinus contracture.     9/19/24: Pain with palpation to the posterior medial aspect of the calcaneus.  Continued swelling and discomfort.  No christin deficit.     2/4/25: Continued pain with palpation involving the posterior aspect of the calcaneus and Achilles tendon insertion with significant Achilles tendon hypertrophy.  No christin deficit appreciated.    3/24/25: Incision site is dry and stable with intact sutures.  No evidence of dehiscence or infection.  Less hypertrophy involving the posterior aspect of the heel.  Achilles tendon appears to be intact.      Results      Assessment & Plan   Diagnoses and all orders for this visit:    1. Chronic pain of right ankle (Primary)    2. Achilles tendinitis, right leg    3. Acquired posterior equinus, right    4. Retrocalcaneal bursitis, right    5. Morbid obesity with BMI of 40.0-44.9, adult      Assessment  & Plan    The surgical procedure involved the detachment of the entire Achilles tendon, which was subsequently reattached using anchors. The spurring has been contoured and removed, significantly reducing the bulk of the tendon. She is advised to initiate gentle range of motion exercises for the ankle, anticipating some degree of scar tissue tightness. A boot will be provided for support, along with two heel wedges to elevate the heel slightly. She is permitted to bear minimal weight on the affected limb. The removal of one wedge is scheduled for next week, with the second wedge to be removed during her subsequent visit in 2 weeks. This will facilitate the gradual flattening of the boot. Gentle massage and range of motion exercises are recommended to alleviate compression in the area. The use of a knee scooter is suggested for mobility, provided her workplace can accommodate this. She is also advised to consider using a walker if additional assistance is required. She is encouraged to listen to her body and progress at a comfortable pace. Once she is comfortable with up and down movements, circular motions in both directions and drawing out the ABCs can help improve her range of motion. She is allowed to soak, shower, and bathe as needed. The application of moisturizer or vitamin E oil on the incision is permitted. Sutures will be removed today. An x-ray will be obtained during her next visit in 2 weeks to monitor progress. Physical therapy will be considered at that time.    Follow-up  The patient is scheduled for a follow-up visit in 2 weeks and repeat imaging             Patient or patient representative verbalized consent for the use of Ambient Listening during the visit with  MARY Emanuel DPM for chart documentation. 3/24/2025  09:55 EDT    MARY Emanuel DPM

## 2025-04-07 ENCOUNTER — OFFICE VISIT (OUTPATIENT)
Age: 55
End: 2025-04-07
Payer: COMMERCIAL

## 2025-04-07 VITALS — HEIGHT: 68 IN | WEIGHT: 257 LBS | HEART RATE: 64 BPM | OXYGEN SATURATION: 98 % | BODY MASS INDEX: 38.95 KG/M2

## 2025-04-07 DIAGNOSIS — G89.29 CHRONIC PAIN OF RIGHT ANKLE: Primary | ICD-10-CM

## 2025-04-07 DIAGNOSIS — M77.31 CALCANEAL SPUR, RIGHT: ICD-10-CM

## 2025-04-07 DIAGNOSIS — M76.61 ACHILLES TENDINITIS, RIGHT LEG: ICD-10-CM

## 2025-04-07 DIAGNOSIS — M25.571 CHRONIC PAIN OF RIGHT ANKLE: Primary | ICD-10-CM

## 2025-04-07 DIAGNOSIS — M21.861 ACQUIRED POSTERIOR EQUINUS, RIGHT: ICD-10-CM

## 2025-04-07 DIAGNOSIS — M77.51 RETROCALCANEAL BURSITIS, RIGHT: ICD-10-CM

## 2025-04-07 DIAGNOSIS — E66.01 MORBID OBESITY WITH BMI OF 40.0-44.9, ADULT: ICD-10-CM

## 2025-04-07 PROCEDURE — 99024 POSTOP FOLLOW-UP VISIT: CPT | Performed by: PODIATRIST

## 2025-04-08 NOTE — PROGRESS NOTES
04/07/2025  Foot and Ankle Surgery - Established Patient/Follow-up  Provider: Dr. Donald Emanuel DPM  Location: [facility]    Subjective:  Alfreda Mcgowan is a 54 y.o. female.     Chief Complaint   Patient presents with    Right Foot - Pain, Post-op     3/10/25 NADIRA  Partial resection of calcaneal bone, right    Achilles tendon repair, right      Post-op     PCP: Aparna Larios MD  Last PCP Visit: 11/18/24         History of Present Illness  The patient presents for evaluation of foot pain.    She reports a satisfactory condition, although with some residual stiffness. She experiences intermittent paresthesia in the form of a pins-and-needles sensation in her foot. She also notes persistent tightness in the plantar region. She has been cautious about weight-bearing activities, opting for partial weight-bearing instead. She is scheduled to return to work this Wednesday, utilizing a knee scooter for mobility. She expresses concern about the duration of knee scooter use upon resuming work and the timeline for regaining driving ability.      No Known Allergies    Current Outpatient Medications on File Prior to Visit   Medication Sig Dispense Refill    baclofen (LIORESAL) 10 MG tablet Take 1 tablet by mouth Daily As Needed for Muscle Spasms.      cetirizine (zyrTEC) 10 MG tablet Take 1 tablet by mouth Daily.      hydroCHLOROthiazide (HYDRODIURIL) 25 MG tablet Take 1 tablet by mouth Daily.      HYDROcodone-acetaminophen (NORCO) 7.5-325 MG per tablet Take 1 tablet by mouth Every 6 (Six) Hours As Needed for Moderate Pain (Pain). 28 tablet 0    levothyroxine (SYNTHROID, LEVOTHROID) 125 MCG tablet Take 1 tablet by mouth Daily.      LORazepam (ATIVAN) 0.5 MG tablet Take 1 tablet by mouth Every 8 (Eight) Hours As Needed for Anxiety.      rosuvastatin (CRESTOR) 5 MG tablet Take 1 tablet by mouth Every Night.      Semaglutide,0.25 or 0.5MG/DOS, (OZEMPIC) 2 MG/3ML solution pen-injector Inject 1 mg under the skin  "into the appropriate area as directed 1 (One) Time Per Week.      sertraline (ZOLOFT) 100 MG tablet Take 1 tablet by mouth Every Night.       No current facility-administered medications on file prior to visit.       Objective   Pulse 64   Ht 172.7 cm (68\")   Wt 117 kg (257 lb)   SpO2 98%   BMI 39.08 kg/m²     Foot/Ankle Exam  Physical Exam  There is a small, stable scab on the skin. The tendon in the musculoskeletal system is thin but intact.      Results      Assessment & Plan   Diagnoses and all orders for this visit:    1. Chronic pain of right ankle (Primary)  -     XR Foot 3+ View Right  -     Ambulatory Referral to Physical Therapy for Evaluation & Treatment    2. Achilles tendinitis, right leg  -     Ambulatory Referral to Physical Therapy for Evaluation & Treatment    3. Acquired posterior equinus, right  -     Ambulatory Referral to Physical Therapy for Evaluation & Treatment    4. Retrocalcaneal bursitis, right    5. Morbid obesity with BMI of 40.0-44.9, adult    6. Calcaneal spur, right      Assessment & Plan    The patient's foot exhibits a small, stable scab, indicative of healing. The tendon, although thinned, remains intact. She reports experiencing stiffness, which is anticipated to persist for some time. Symptoms such as burning, numbness, tingling, cramping, and spasms are expected to gradually subside. The presence of scar tissue and tightness in the plantar fascia is noted but does not raise significant concern at this juncture. Given her reduced activity level, swelling and intense throbbing are not currently expected. It is projected that her condition will improve incrementally on a weekly basis, particularly as she transitions from the boot to regular footwear and resumes normal activities. She is advised to continue wearing the boot for an additional 2 weeks, after which she can gradually transition to a tennis shoe. Physical therapy is recommended to enhance her recovery. She is " encouraged to progressively increase weight-bearing through the boot and to discontinue the use of the knee scooter or wheelchair. She is permitted to walk in the boot and to take a few steps around the office while wearing it. However, if standing for extended periods or engaging in strenuous activities, the use of the knee scooter is advised. She is cleared to return to work with accommodations to avoid prolonged standing. A work note will be provided. She is advised against driving while wearing the boot and to exercise caution when driving without it.    Follow-up  The patient will follow up in 4 weeks             Patient or patient representative verbalized consent for the use of Ambient Listening during the visit with  MARY Emanuel DPM for chart documentation. 4/8/2025  06:45 EDT    MARY Emanuel DPM

## 2025-04-22 ENCOUNTER — TREATMENT (OUTPATIENT)
Dept: PHYSICAL THERAPY | Facility: CLINIC | Age: 55
End: 2025-04-22
Payer: COMMERCIAL

## 2025-04-22 DIAGNOSIS — M21.861 ACQUIRED POSTERIOR EQUINUS, RIGHT: ICD-10-CM

## 2025-04-22 DIAGNOSIS — M25.571 CHRONIC PAIN OF RIGHT ANKLE: Primary | ICD-10-CM

## 2025-04-22 DIAGNOSIS — M76.61 ACHILLES TENDINITIS, RIGHT LEG: ICD-10-CM

## 2025-04-22 DIAGNOSIS — Z98.890 S/P ACHILLES TENDON REPAIR: ICD-10-CM

## 2025-04-22 DIAGNOSIS — G89.29 CHRONIC PAIN OF RIGHT ANKLE: Primary | ICD-10-CM

## 2025-04-22 DIAGNOSIS — M53.3 SI (SACROILIAC) JOINT DYSFUNCTION: ICD-10-CM

## 2025-04-22 NOTE — PROGRESS NOTES
Physical Therapy Initial Evaluation and Plan of Care  [address] [address]      Patient: Alfreda Mcgowan   : 1970  Diagnosis/ICD-10 Code:  Chronic pain of right ankle [M25.571, G89.29]  Referring practitioner: MARY Emanuel DPM  Date of Initial Visit: 2025  Today's Date: 2025  Patient seen for 1 session           Visit Diagnoses:     ICD-10-CM ICD-9-CM   1. Chronic pain of right ankle  M25.571 719.47    G89.29 338.29   2. Achilles tendinitis, right leg  M76.61 726.71   3. Acquired posterior equinus, right  M21.861 736.72   4. S/P Achilles tendon repair  Z98.890 V45.89   5. SI (sacroiliac) joint dysfunction  M53.3 724.6       Subjective Questionnaire: LEFS: 15/80 = 18.75% ability/81.25% limited    Subjective Evaluation    History of Present Illness  Mechanism of injury: Pt reports >1 yr R ankle pain from a previous injury tripping over a small dog that she was walking. Pt states she thought she just sprained her ankle, but had continued issues with it. Pt had another procedure a year ago on it which only gave temporary relief. Pt did 2 bouts of PT and some injex with just temporary relief.     Pt had outpt surgery on 3/10/25 with Calcaneal spur removal with Achilles tendon repair to the right lower extremity. Pt was in a cast for the first 2 weeks. Pt arrives to initiate OPPT. Pt is to RMD on 25 with plan to remove boot and wean to tennis shoe. Pt is no longer wearing wedges in the boot. Pt still has tingling and numbness at the heel and the top of the foot.     On Saturday pt bumped the lateral ankle on the knee scooter. Pt notes she's been walking ~10 steps into the bathroom at night as she does not want to wear the boot while she's at home and her scooter is in the other room.     Denies hx/current: pacemaker, metal implants, CVA, seizures, MI, DM, latex allergies, OP, pregnant (current)    Pain: 0/10 current, 0/10 at best, 8-9/10 at  worst    Aggravating/functional factors: sitting if leg is dependent too long, standing, walking, bending, squatting, lifting, carrying, washing/ dressing/grooming, pushing, pulling, stairs/steps, in/out of car, rising, sleeping, house work, driving (difficult & uncomfortable - has only been doing the last week without the boot), wearing a shoe    PLOF: some prior issues with the above functional activities with prior injuries to the R foot    Relieving factors: ice, IBU prn    Social Hx: lives with spouse; ; couple steps out the door with a rail; knitting      Quality of life: fair    Pain  Quality: sharp and burning (stabbing)    Hand dominance: right    Treatments  Previous treatment: physical therapy and injection treatment  Current treatment: immobilization  Patient Goals  Patient goals for therapy: decreased pain, improved balance, increased strength, independence with ADLs/IADLs, return to sport/leisure activities, increased motion and decreased edema  Patient goal: return to PLOF; be able to go do something for 30 yr anniversary in December, walk with 4.4 y/o grandchild around the block         Past Medical History:   Diagnosis Date    Allergic     Anxiety     Depression 2000    Difficulty walking     Disease of thyroid gland     Hyperlipidemia     Hypertension     Plantar fasciitis     PONV (postoperative nausea and vomiting)     Stress fracture    Fx R big toe 4.5 yrs ago  Stress fx was at outer R foot  Basal cell cancer 8 yrs ago was removed from R chest region  Allergies are seasonal/environmental    Past Surgical History:   Procedure Laterality Date    ACHILLES TENDON SURGERY Right 3/10/2025    Procedure: Calcaneal spur removal with Achilles tendon repair to the right lower extremity;  Surgeon: MARY Emanuel DPM;  Location: [facility] OR;  Service: Podiatry;  Laterality: Right;    CALCANEAL SPUR EXCISION Right 3/8/2024    Procedure: VICENTA REMOVAL;  Surgeon: MARY Emanuel DPM;   Location: [facility] MAIN OR;  Service: Podiatry;  Laterality: Right;    COLONOSCOPY      woke vomiting    HYSTERECTOMY      partial    RECESSION GASTROCNEMIUS Right 3/8/2024    Procedure: RECESSION GASTROCNEMIUS (30 MIN TOTAL);  Surgeon: MARY Emanuel DPM;  Location: [facility] MAIN OR;  Service: Podiatry;  Laterality: Right;    TONSILLECTOMY  2002       Objective          Active Range of Motion     Right Ankle/Foot   Plantar flexion: 55 degrees   Great toe flexion: 32 degrees   Great toe extension: 50 degrees     Swelling     Right Ankle/Foot   Metatarsal heads: 25.1 cm  Figure 8: 58 cm  Malleoli: 30.3 cm     Additional ROM details:   Hips/knees/L ankle grossly WFL     Strength tested in sitting:   L UE grossly 4+ to 5 except hip abd 4-, hip add 4 (did not test ext B)  R hip flex 4-  R hip abd 4-  R hip add 4+  R quad set poor/fair contraction  R hams deferred today    Observation: incision healing; slight erythema at distal incision, no drainage noted; 2 scabs mid & distal incision and small ~1/2 cm [place] at prox incision looks almost like a blister, but is more firm pt feels may be from boot rubbing on the area and has tried extra padding; pocket of edema lateral ankle    Palpation: calf supple and non-tender;  appears with some hip ER noted in supine    Sensation: intact to LT R lower leg/ankle/foot with diminished sensation/tingling dorsum R foot    Posture: head fwd/rounded shoulders    Gait: I with walking boot with reduced gt speed and step length    Balance: appears fair/good in walking boot with moving around the clinic    Transfers: I sit to stand with UE A prn    Flexibility: appears with tight hip flexors with walking; tight hams, ITB, gastroc/soleus    Discussed treatment plan and initiated HEP with handout and [place] access code: [email or web address]; discussed that pt shouldn't be walking without the boot unless MD told her it's ok; ed to avoid going past 5 degrees past neutral with PROM in DF; discussed  protocol and progressions; discussed icing/elevation above level of heart or as able while at work elevating onto stool and performing ankle pumps for swelling to 5 degs past neutral at most      Assessment & Plan       Assessment  Impairments: abnormal coordination, abnormal gait, abnormal muscle firing, abnormal muscle tone, abnormal or restricted ROM, activity intolerance, impaired balance, impaired physical strength, lacks appropriate home exercise program, pain with function, safety issue and weight-bearing intolerance   Assessment details: The patient is a 54 y.o. female who presents to physical therapy today for Chronic pain of right ankle, Achilles tendinitis, right leg, Acquired posterior equinus, right with   S/P Achilles tendon repair and calcaneal spur removal on 3/10/25. PT added SI dysfunction.     Upon initial evaluation, the patient demonstrates the above & following impairments: pain, reduced posture, SI/IS dysfunction, decreased ROM/flexibility, strength, gait, balance and function. Due to these impairments, the patient is unable to/limited with: sitting if leg is dependent too long, standing, walking, bending, squatting, lifting, carrying, washing/ dressing/grooming, pushing, pulling, stairs/steps, in/out of car, rising, sleeping, house work, driving (difficult & uncomfortable - has only been doing the last week without the boot), wearing a shoe. The patient would benefit from skilled PT services to address functional limitations and impairments and to improve patient quality of life.      Barriers to therapy: anxiety, depression, plantar fasciitis, HTN, thyroid disease and hx basal cancer could affect PT Rx/progress/outcomes if exacerbated/unregulated or if cancer recurs elsewhere which could affect tolerance to PT/exs  Prognosis: good    Goals  Plan Goals: STGs in 4 weeks:  Decrease pain to 5/10 on average  Increase LE ROM by 5-10 degrees where limited as much  Increase LE strength to  4/5    LTGs by discharge  Increase trunk/LE ROM to WFL/WNL  Increase LE strength to 5/5   Pt will be able to ascend/descend stairs reciprocally with or without use of rail(s) and with minimal difficulty or pain  Pt will be able to sit/drive/ride for 30-60 mins without difficulty or pain  Pt will be able to stand 30-60 mins for basic ADLs without difficulty or pain  Pt will be able to walk 30-60 mins for grocery shopping without difficulty, pain or LOB  Pt will be able to wash/dress/groom without difficulty or increased pain  Pt will be able to push/pull vacuum, grocery cart or heavy doors without difficulty or increased pain  Pt will be able to lift/carry laundry baskets, pots/pans, garbage or grocery bags without difficulty or increased pain  Pt will be able to sleep full nights most nights without waking from LBP/LE symptoms      Plan  Therapy options: will be seen for skilled therapy services  Planned modality interventions: cryotherapy, thermotherapy (hydrocollator packs) and electrical stimulation/Russian stimulation  Planned therapy interventions: manual therapy, neuromuscular re-education, postural training, soft tissue mobilization, spinal/joint mobilization, strengthening, stretching, therapeutic activities, transfer training, abdominal trunk stabilization, ADL retraining, body mechanics training, home exercise program, gait training, functional ROM exercises, flexibility, motor coordination training, balance/weight-bearing training and joint mobilization  Frequency: 3x week  Duration in weeks: 13  Treatment plan discussed with: patient        History # of Personal Factors and/or Comorbidities: HIGH (3+)  Examination of Body System(s): # of elements: HIGH (4+)  Clinical Presentation: EVOLVING variable pain, high pain levels, has been walking without boot short distances, comorbidity  Clinical Decision Making: MODERATE      Timed:         Manual Therapy:  10       mins  93702;     Therapeutic Exercise:  8       mins  24829;     Neuromuscular Marty:        mins  72903;    Therapeutic Activity:          mins  70566;     Gait Training:           mins  44747;     Ultrasound:          mins  51207;    Ionto                                   mins   91945  Self Care                       4     mins   04713  Canalith Repos         mins 21271      Un-Timed:  Electrical Stimulation:         mins  76683 ( );  Traction          mins 57966  Low Eval          Mins  87537  Mod Eval    38       Mins  17202  High Eval                            Mins  64323  Re-Eval                               mins  40923        Timed Treatment:  22    mins   Total Treatment:     60   mins        PT SIGNATURE: Flavia Fleming, PT   [place] Lic# [id number]  DATE TREATMENT INITIATED: 4/22/2025    Initial Certification  Certification Period: 4/22/20256186UYKIWTF0/20/2025  I certify that the therapy services are furnished while this patient is under my care.  The services outlined above are required by this patient, and will be reviewed every 90 days.      Physician Signature: _________________________   PHYSICIAN: MARY Emanuel DPM   NPI: [id number]         DATE: _____________________________________    Please sign and return via fax to [phone]. Thank you, [facility].

## 2025-04-30 ENCOUNTER — TREATMENT (OUTPATIENT)
Dept: PHYSICAL THERAPY | Facility: CLINIC | Age: 55
End: 2025-04-30
Payer: COMMERCIAL

## 2025-04-30 DIAGNOSIS — G89.29 CHRONIC PAIN OF RIGHT ANKLE: Primary | ICD-10-CM

## 2025-04-30 DIAGNOSIS — M21.861 ACQUIRED POSTERIOR EQUINUS, RIGHT: ICD-10-CM

## 2025-04-30 DIAGNOSIS — M76.61 ACHILLES TENDINITIS, RIGHT LEG: ICD-10-CM

## 2025-04-30 DIAGNOSIS — M25.571 CHRONIC PAIN OF RIGHT ANKLE: Primary | ICD-10-CM

## 2025-04-30 DIAGNOSIS — Z98.890 S/P ACHILLES TENDON REPAIR: ICD-10-CM

## 2025-04-30 DIAGNOSIS — M53.3 SI (SACROILIAC) JOINT DYSFUNCTION: ICD-10-CM

## 2025-04-30 NOTE — PROGRESS NOTES
Physical Therapy Treatment Note  [address] [place] [address]      Patient: Alfreda Mcgowan   : 1970  Diagnosis/ICD-10 Code:  Chronic pain of right ankle [M25.571, G89.29]  Referring practitioner: MARY Emanuel DPM  Date of Initial Visit: Type: THERAPY  Noted: 2025  Today's Date: 2025  Patient seen for 2 sessions           Visit Diagnoses:     ICD-10-CM ICD-9-CM   1. Chronic pain of right ankle  M25.571 719.47    G89.29 338.29   2. Achilles tendinitis, right leg  M76.61 726.71   3. Acquired posterior equinus, right  M21.861 736.72   4. S/P Achilles tendon repair  Z98.890 V45.89   5. SI (sacroiliac) joint dysfunction  M53.3 724.6       Subjective Alfreda Mcgowan reports yesterday evening she was in a lot of pain. Pt thinks she did too much walking or standing in the boot. Pt notes she was not going to put the brace on just to walk in and have to take it back off. Pt states it feels like the skin is thin or raw at the distal end of the incision which she feels is from the boot and has been trying to put a thicker sock on to increase cushioning in the area. Pt thinks the prox end of the incision is better. Pt notes she sees Dr. Emanuel on Monday and he will assess for weaning out of the boot at that time.    Pain: 4    Objective   Observation: pt came in to PT carrying her boot and walking in socks; mild erythema with sloughed off skin at distal end of incision    See Exercise, Manual, Modality, and/or Vestibular Logs for complete treatment.     Patient Education: cues for therex/theracts/NMR for form, reps, holds, and for avoiding compensation and pain; reiterated stopping DF at first sign of tension    Assessment/Plan Pt tolerated manual well. Some tenderness at lateral malleolus during manual. Pt with visibly improved ankle ROM with exs. Progressed per flow sheet.     Progress per Plan of Care and Progress strengthening /stabilization /functional activity             Timed:         Manual Therapy:   13      mins  95818;     Therapeutic Exercise:   22      mins  48739;     Neuromuscular Marty: 10     mins  82952;    Therapeutic Activity:          mins  79611;     Gait Training:           mins  82155;     Ultrasound:          mins  08311;    Ionto                                   mins   72254  Self Care                            mins   32036    Un-Timed:  Electrical Stimulation:         mins  70217 ( );  Traction          mins 12910  Canalith Repos                   mins  10498  Re-Eval                               mins  40707    Timed Treatment:  45    mins   Total Treatment:    45   mins          Flavia Fleming, PT    Physical Therapist

## 2025-05-05 ENCOUNTER — OFFICE VISIT (OUTPATIENT)
Age: 55
End: 2025-05-05
Payer: COMMERCIAL

## 2025-05-05 ENCOUNTER — TREATMENT (OUTPATIENT)
Dept: PHYSICAL THERAPY | Facility: CLINIC | Age: 55
End: 2025-05-05
Payer: COMMERCIAL

## 2025-05-05 VITALS — HEIGHT: 55 IN | OXYGEN SATURATION: 98 % | HEART RATE: 67 BPM | BODY MASS INDEX: 59.47 KG/M2 | WEIGHT: 257 LBS

## 2025-05-05 DIAGNOSIS — G89.29 CHRONIC PAIN OF RIGHT ANKLE: Primary | ICD-10-CM

## 2025-05-05 DIAGNOSIS — M21.861 ACQUIRED POSTERIOR EQUINUS, RIGHT: ICD-10-CM

## 2025-05-05 DIAGNOSIS — M53.3 SI (SACROILIAC) JOINT DYSFUNCTION: ICD-10-CM

## 2025-05-05 DIAGNOSIS — M25.571 CHRONIC PAIN OF RIGHT ANKLE: Primary | ICD-10-CM

## 2025-05-05 DIAGNOSIS — M76.61 ACHILLES TENDINITIS, RIGHT LEG: ICD-10-CM

## 2025-05-05 DIAGNOSIS — Z98.890 S/P ACHILLES TENDON REPAIR: ICD-10-CM

## 2025-05-05 PROCEDURE — 97112 NEUROMUSCULAR REEDUCATION: CPT | Performed by: PHYSICAL THERAPIST

## 2025-05-05 PROCEDURE — 97110 THERAPEUTIC EXERCISES: CPT | Performed by: PHYSICAL THERAPIST

## 2025-05-05 PROCEDURE — 97140 MANUAL THERAPY 1/> REGIONS: CPT | Performed by: PHYSICAL THERAPIST

## 2025-05-05 PROCEDURE — 99024 POSTOP FOLLOW-UP VISIT: CPT | Performed by: PODIATRIST

## 2025-05-06 NOTE — PROGRESS NOTES
05/05/2025  Foot and Ankle Surgery - Established Patient/Follow-up  Provider: Dr. Donald Emanuel DPM  Location: [facility]    Subjective:  Alfreda Mcgowan is a 54 y.o. female.     Chief Complaint   Patient presents with    Right Foot - Pain, Post-op     3/10/25 NADIRA  Partial resection of calcaneal bone, right    Achilles tendon repair, right      Post-op Follow-up     Aparna Larios MD  11/18/24       History of Present Illness  The patient presents for evaluation of heel pain.    He is approximately 2 months post-surgery and has been engaging in physical therapy, which he reports as beneficial. He notes a significant improvement in his condition compared to the period prior to his initial surgery. He has observed a marked reduction in pain, particularly when standing in the shower, a task that previously necessitated immediate rest. He also reports a sensation of thinness and discomfort in a specific area of his skin. He has initiated the use of compression stockings due to perceived ineffectiveness of the compression sleeve. He has identified that the boot was contributing to his discomfort. During his third physical therapy session, he noticed a pronounced weakness in his calf and thigh muscles compared to the contralateral side.      No Known Allergies    Current Outpatient Medications on File Prior to Visit   Medication Sig Dispense Refill    baclofen (LIORESAL) 10 MG tablet Take 1 tablet by mouth Daily As Needed for Muscle Spasms.      cetirizine (zyrTEC) 10 MG tablet Take 1 tablet by mouth Daily.      hydroCHLOROthiazide (HYDRODIURIL) 25 MG tablet Take 1 tablet by mouth Daily.      HYDROcodone-acetaminophen (NORCO) 7.5-325 MG per tablet Take 1 tablet by mouth Every 6 (Six) Hours As Needed for Moderate Pain (Pain). 28 tablet 0    levothyroxine (SYNTHROID, LEVOTHROID) 125 MCG tablet Take 1 tablet by mouth Daily.      LORazepam (ATIVAN) 0.5 MG tablet Take 1 tablet by mouth Every 8 (Eight) Hours  "As Needed for Anxiety.      rosuvastatin (CRESTOR) 5 MG tablet Take 1 tablet by mouth Every Night.      Semaglutide,0.25 or 0.5MG/DOS, (OZEMPIC) 2 MG/3ML solution pen-injector Inject 1 mg under the skin into the appropriate area as directed 1 (One) Time Per Week.      sertraline (ZOLOFT) 100 MG tablet Take 1 tablet by mouth Every Night.       No current facility-administered medications on file prior to visit.       Objective   Pulse 67   Ht 68 cm (26.77\")   Wt 117 kg (257 lb)   SpO2 98%   .10 kg/m²     Foot/Ankle Exam  Physical Exam  GENERAL  Appearance:  appears stated age and obese  Orientation:  AAOx3  Affect:  appropriate  Gait:  unimpaired  Assistance:  independent  Right shoe gear: casual shoe  Left shoe gear: casual shoe     VASCULAR      Right Foot Vascularity   Normal vascular exam    Dorsalis pedis:  2+  Posterior tibial:  2+  Skin temperature:  warm  Edema grading:  None  CFT:  < 3 seconds  Pedal hair growth:  Present  Varicosities:  none      Left Foot Vascularity   Normal vascular exam    Dorsalis pedis:  2+  Posterior tibial:  2+  Skin temperature:  warm  Edema grading:  None  CFT:  < 3 seconds  Pedal hair growth:  Present  Varicosities:  none     NEUROLOGIC      Right Foot Neurologic   Normal sensation    Light touch sensation: normal  Vibratory sensation: normal  Hot/Cold sensation: normal  Normal reflexes    Achilles reflex:  2+  Babinski reflex:  2+      Left Foot Neurologic   Normal sensation    Light touch sensation: normal  Vibratory sensation: normal  Hot/Cold sensation:  normal  Normal reflexes    Achilles reflex:  2+  Babinski reflex:  2+     MUSCULOSKELETAL      Right Foot Musculoskeletal   Ecchymosis:  none  Arch:  Normal      Left Foot Musculoskeletal   Ecchymosis:  none  Arch:  Normal     MUSCLE STRENGTH      Right Foot Muscle Strength   Normal strength    Foot dorsiflexion:  5  Foot plantar flexion:  5  Foot inversion:  5  Foot eversion:  5      Left Foot Muscle Strength "   Normal strength    Foot dorsiflexion:  5  Foot plantar flexion:  5  Foot inversion:  5  Foot eversion:  5     RANGE OF MOTION      Right Foot Range of Motion   Foot and ankle ROM within normal limits        Left Foot Range of Motion   Foot and ankle ROM within normal limits       DERMATOLOGIC       Right Foot Dermatologic   Skin  Right foot skin is intact.       Left Foot Dermatologic   Skin  Left foot skin is intact.      Right foot additional comments: Incision sites are dry and stable with intact sutures and staples. No evidence of dehiscence or infection. No significant pain or limitation. Minimal swelling to the retrocalcaneal region.     4/23/24: No significant discomfort with palpation involving the posterior aspect of the heel.  Moderate tightness and discomfort involving the operative site.  No progressive deformity.  Strength is improving to the posterior aspect of the leg.  No significant calf pain     6/25/24: No significant discomfort involving the calf region.  Moderate discomfort involving the posterior medial aspect of the calcaneus with moderate swelling involving the retrocalcaneal bursa.  Achilles tendon appears to be intact with mild hypertrophy     8/8/24: Discomfort with palpation involving the posterior medial aspect of the heel.  Moderate swelling.  No obvious defect involving the Achilles tendon.  No progressive deformity or instability.  No prominent residual equinus contracture.     9/19/24: Pain with palpation to the posterior medial aspect of the calcaneus.  Continued swelling and discomfort.  No christin deficit.     2/4/25: Continued pain with palpation involving the posterior aspect of the calcaneus and Achilles tendon insertion with significant Achilles tendon hypertrophy.  No christin deficit appreciated.     3/24/25: Incision site is dry and stable with intact sutures.  No evidence of dehiscence or infection.  Less hypertrophy involving the posterior aspect of the heel.  Achilles  tendon appears to be intact.    5/5/25: Continued improvement with less swelling and discomfort involving the posterior aspect of the heel.  Achilles tendon remains intact.  Minimal hypertrophy.  No gross signs of inflammation      Results      Assessment & Plan   Diagnoses and all orders for this visit:    1. Chronic pain of right ankle (Primary)    2. Achilles tendinitis, right leg    3. Acquired posterior equinus, right      Assessment & Plan    The patient's recovery trajectory is consistent with expectations for a 2-month post-procedure status. The presence of scar tissue is anticipated to gradually diminish over time. He reports improvements in standing duration without significant pain, indicating progress. The patient was advised to persist with physical therapy if perceived benefits continue. The use of supportive footwear and adherence to home stretching exercises were emphasized. Alternative therapeutic modalities such as dry needling, acupuncture, and massage were discussed. Low-impact activities including gym-based exercises, swimming, water aerobics, and yoga were recommended. The patient was instructed to apply warm compresses to the area of discomfort, which is likely due to a deep suture. Regular moisturizing of the heel was suggested to maintain skin softness and suppleness. The use of a standard compression stocking (15 to 20 mmHg) was recommended for daily wear. The patient was encouraged to gradually transition away from the boot, which may initially result in some discomfort but should ultimately lead to muscle strengthening and improved gait. If there are any changes in his condition, he will contact the office.    Follow-up  The patient will follow up in 6 to 8 weeks.               Patient or patient representative verbalized consent for the use of Ambient Listening during the visit with  MARY Emanuel DPM for chart documentation. 5/6/2025  06:49 EDT    MARY Emanuel DPM

## 2025-05-08 ENCOUNTER — TREATMENT (OUTPATIENT)
Dept: PHYSICAL THERAPY | Facility: CLINIC | Age: 55
End: 2025-05-08
Payer: COMMERCIAL

## 2025-05-08 DIAGNOSIS — G89.29 CHRONIC PAIN OF RIGHT ANKLE: Primary | ICD-10-CM

## 2025-05-08 DIAGNOSIS — Z98.890 S/P ACHILLES TENDON REPAIR: ICD-10-CM

## 2025-05-08 DIAGNOSIS — M25.571 CHRONIC PAIN OF RIGHT ANKLE: Primary | ICD-10-CM

## 2025-05-08 DIAGNOSIS — M21.861 ACQUIRED POSTERIOR EQUINUS, RIGHT: ICD-10-CM

## 2025-05-08 DIAGNOSIS — M76.61 ACHILLES TENDINITIS, RIGHT LEG: ICD-10-CM

## 2025-05-08 DIAGNOSIS — M53.3 SI (SACROILIAC) JOINT DYSFUNCTION: ICD-10-CM

## 2025-05-08 NOTE — PROGRESS NOTES
Physical Therapy Treatment Note  [address] [place] [address]      Patient: Alfreda Mcgowan   : 1970  Diagnosis/ICD-10 Code:  Chronic pain of right ankle [M25.571, G89.29]  Referring practitioner: MARY Emanuel DPM  Date of Initial Visit: Type: THERAPY  Noted: 2025  Today's Date: 2025  Patient seen for 4 sessions           Visit Diagnoses:     ICD-10-CM ICD-9-CM   1. Chronic pain of right ankle  M25.571 719.47    G89.29 338.29   2. Achilles tendinitis, right leg  M76.61 726.71   3. Acquired posterior equinus, right  M21.861 736.72   4. S/P Achilles tendon repair  Z98.890 V45.89   5. SI (sacroiliac) joint dysfunction  M53.3 724.6       Subjective Alfreda Mcgowan reports soreness yesterday and today. Pain 5-6. Dr. Emanuel said the repair appears to be intact and he told her she still needs to be careful. Pt could not tolerate the back of a shoe on the area, but is feeling better lately. Pt noting that she can't go up on her toes yet.     Objective     See Exercise, Manual, Modality, and/or Vestibular Logs for complete treatment.     Patient Education: cues for therex/theracts/NMR for form, reps, holds, and for avoiding compensation and pain; discussed that heel raises are not supposed to be done until wk 10-12 and starts in sitting    Assessment/Plan Dr. Emanuel note indicates gradual weaning from the boot. Pt tolerated manual well. Added leg press and form stepping/walking, but unable to perform form stepping on the L due to increased tension/pain at the medial ankle. Pt was however able to demonstrate improved gait with WFL DF noted. Ended with ice.     Progress per Plan of Care and Progress strengthening /stabilization /functional activity            Timed:         Manual Therapy:   12      mins  50202;     Therapeutic Exercise:   14     mins  84474;     Neuromuscular Marty:  10      mins  54983;    Therapeutic Activity:     4     mins  03508;     Gait Training:            mins  44779;     Ultrasound:          mins  43457;    Ionto                                   mins   17494  Self Care                            mins   38152    Un-Timed:  Electrical Stimulation:         mins  67912 ( );  Traction          mins 27793  Canalith Repos                   mins  65324  Re-Eval                               mins  14141    Timed Treatment:  40    mins   Total Treatment:     40  mins          Flavia Fleming, PT    Physical Therapist

## 2025-05-12 ENCOUNTER — TREATMENT (OUTPATIENT)
Dept: PHYSICAL THERAPY | Facility: CLINIC | Age: 55
End: 2025-05-12
Payer: COMMERCIAL

## 2025-05-12 DIAGNOSIS — G89.29 CHRONIC PAIN OF RIGHT ANKLE: Primary | ICD-10-CM

## 2025-05-12 DIAGNOSIS — M53.3 SI (SACROILIAC) JOINT DYSFUNCTION: ICD-10-CM

## 2025-05-12 DIAGNOSIS — M25.571 CHRONIC PAIN OF RIGHT ANKLE: Primary | ICD-10-CM

## 2025-05-12 DIAGNOSIS — M76.61 ACHILLES TENDINITIS, RIGHT LEG: ICD-10-CM

## 2025-05-12 DIAGNOSIS — M21.861 ACQUIRED POSTERIOR EQUINUS, RIGHT: ICD-10-CM

## 2025-05-12 DIAGNOSIS — Z98.890 S/P ACHILLES TENDON REPAIR: ICD-10-CM

## 2025-05-12 PROCEDURE — 97110 THERAPEUTIC EXERCISES: CPT | Performed by: PHYSICAL THERAPIST

## 2025-05-12 PROCEDURE — 97140 MANUAL THERAPY 1/> REGIONS: CPT | Performed by: PHYSICAL THERAPIST

## 2025-05-12 PROCEDURE — 97014 ELECTRIC STIMULATION THERAPY: CPT | Performed by: PHYSICAL THERAPIST

## 2025-05-12 NOTE — PROGRESS NOTES
Physical Therapy Treatment Note  [address] [place] [address]      Patient: Alfreda Mcgowan   : 1970  Diagnosis/ICD-10 Code:  Chronic pain of right ankle [M25.571, G89.29]  Referring practitioner: MARY Emanuel DPM  Date of Initial Visit: Type: THERAPY  Noted: 2025  Today's Date: 2025  Patient seen for 5 sessions           Visit Diagnoses:     ICD-10-CM ICD-9-CM   1. Chronic pain of right ankle  M25.571 719.47    G89.29 338.29   2. Achilles tendinitis, right leg  M76.61 726.71   3. Acquired posterior equinus, right  M21.861 736.72   4. S/P Achilles tendon repair  Z98.890 V45.89   5. SI (sacroiliac) joint dysfunction  M53.3 724.6       Subjective Alfreda Mcgowan reports she did more this weekend than usual with going out both days. Pt went to a graduation party on Saturday. The back yard was sloped and uneven. Then on  she went out with her son. Pt states when it hurts it makes her feel like the socks are putting pressure on the toes and making them want to draw up. Pt's pain is 6-7. Pt had to go back to her backless shoes due to the pain. Pain is more around the lateral malleolus.     Objective     See Exercise, Manual, Modality, and/or Vestibular Logs for complete treatment.     Patient Education: cues for therex/theracts/NMR for form, reps, holds, and for avoiding compensation and pain    Assessment/Plan   Pt tolerated manual therapy with some emphasis to lateral ankle and peroneals due to ms tightness/banding felt. Pt tolerated manual well with reports of some reduced pain afterwards. Tasks per flow sheet as tolerated and ended with ice/IFC. Deferred standing tasks and those noted per flow sheet.     Progress per Plan of Care and Progress strengthening /stabilization /functional activity            Timed:         Manual Therapy:    15     mins  74711;     Therapeutic Exercise:    13     mins  63247;     Neuromuscular Marty:        mins  87694;    Therapeutic  Activity:          mins  93468;     Gait Training:           mins  43514;     Ultrasound:          mins  82103;    Ionto                                   mins   41155  Self Care                            mins   34551    Un-Timed:  Electrical Stimulation:   15      mins  45426 ( );  Traction          mins 57657  Canalith Repos                   mins  38000  Re-Eval                               mins  25992    Timed Treatment:  28    mins   Total Treatment:    43   mins          Flavia Fleming, PT    Physical Therapist

## 2025-05-15 ENCOUNTER — TREATMENT (OUTPATIENT)
Dept: PHYSICAL THERAPY | Facility: CLINIC | Age: 55
End: 2025-05-15
Payer: COMMERCIAL

## 2025-05-15 DIAGNOSIS — G89.29 CHRONIC PAIN OF RIGHT ANKLE: Primary | ICD-10-CM

## 2025-05-15 DIAGNOSIS — M76.61 ACHILLES TENDINITIS, RIGHT LEG: ICD-10-CM

## 2025-05-15 DIAGNOSIS — Z98.890 S/P ACHILLES TENDON REPAIR: ICD-10-CM

## 2025-05-15 DIAGNOSIS — M25.571 CHRONIC PAIN OF RIGHT ANKLE: Primary | ICD-10-CM

## 2025-05-15 DIAGNOSIS — M21.861 ACQUIRED POSTERIOR EQUINUS, RIGHT: ICD-10-CM

## 2025-05-15 DIAGNOSIS — M53.3 SI (SACROILIAC) JOINT DYSFUNCTION: ICD-10-CM

## 2025-05-15 NOTE — PROGRESS NOTES
Physical Therapy Treatment Note  [address] [place] [address]      Patient: Alfreda Mcgowan   : 1970  Diagnosis/ICD-10 Code:  Chronic pain of right ankle [M25.571, G89.29]  Referring practitioner: MARY Emanuel DPM  Date of Initial Visit: Type: THERAPY  Noted: 2025  Today's Date: 5/15/2025  Patient seen for 6 sessions           Visit Diagnoses:     ICD-10-CM ICD-9-CM   1. Chronic pain of right ankle  M25.571 719.47    G89.29 338.29   2. Achilles tendinitis, right leg  M76.61 726.71   3. Acquired posterior equinus, right  M21.861 736.72   4. S/P Achilles tendon repair  Z98.890 V45.89   5. SI (sacroiliac) joint dysfunction  M53.3 724.6       Subjective Alfreda Mcgowan reports pain is 5/10 this morning, better than the other day. Pt still gets occasional sharp pain behind the lateral malleolus. Pt showed PT pictures of when she fx'd her R gt toe with the ecchymosis and notes that her motion and gait has been limited from that prior to this issue, so she doesn't think she'll be able to normalize gait completely from that.     Objective     See Exercise, Manual, Modality, and/or Vestibular Logs for complete treatment.     Patient Education: cues for therex/theracts/NMR for form, reps, holds, and for avoiding compensation and pain    Assessment/Plan Manual was well tolerated without any significant c/o pain. Added 1# to leg reaises, added prone knee flex & prone quad stretches well. Pt tolerated all fairly well. Ended with ice to ankle.     Progress per Plan of Care and Progress strengthening /stabilization /functional activity per protocol.            Timed:         Manual Therapy:   10      mins  13540;     Therapeutic Exercise:   15      mins  83235;     Neuromuscular Marty:  16      mins  01477;    Therapeutic Activity:          mins  87064;     Gait Training:           mins  03798;     Ultrasound:          mins  57941;    Ionto                                   mins    24161  Self Care                            mins   78979    Un-Timed:  Electrical Stimulation:         mins  24057 ( );  Traction          mins 30883  Canalith Repos                   mins  85491  Re-Eval                               mins  05163    Timed Treatment:   41   mins   Total Treatment:      41  mins          Flavia Fleming, PT    Physical Therapist

## 2025-05-19 ENCOUNTER — TREATMENT (OUTPATIENT)
Dept: PHYSICAL THERAPY | Facility: CLINIC | Age: 55
End: 2025-05-19
Payer: COMMERCIAL

## 2025-05-19 DIAGNOSIS — M53.3 SI (SACROILIAC) JOINT DYSFUNCTION: ICD-10-CM

## 2025-05-19 DIAGNOSIS — G89.29 CHRONIC PAIN OF RIGHT ANKLE: Primary | ICD-10-CM

## 2025-05-19 DIAGNOSIS — M76.61 ACHILLES TENDINITIS, RIGHT LEG: ICD-10-CM

## 2025-05-19 DIAGNOSIS — M25.571 CHRONIC PAIN OF RIGHT ANKLE: Primary | ICD-10-CM

## 2025-05-19 DIAGNOSIS — M21.861 ACQUIRED POSTERIOR EQUINUS, RIGHT: ICD-10-CM

## 2025-05-19 DIAGNOSIS — Z98.890 S/P ACHILLES TENDON REPAIR: ICD-10-CM

## 2025-05-19 PROCEDURE — 97110 THERAPEUTIC EXERCISES: CPT | Performed by: PHYSICAL THERAPIST

## 2025-05-19 PROCEDURE — 97112 NEUROMUSCULAR REEDUCATION: CPT | Performed by: PHYSICAL THERAPIST

## 2025-05-19 PROCEDURE — 97140 MANUAL THERAPY 1/> REGIONS: CPT | Performed by: PHYSICAL THERAPIST

## 2025-05-19 NOTE — PROGRESS NOTES
Physical Therapy Treatment Note  [address] [place] [address]      Patient: Alfreda Mcgowan   : 1970  Diagnosis/ICD-10 Code:  Chronic pain of right ankle [M25.571, G89.29]  Referring practitioner: MARY Emanuel DPM  Date of Initial Visit: Type: THERAPY  Noted: 2025  Today's Date: 2025  Patient seen for 7 sessions           Visit Diagnoses:     ICD-10-CM ICD-9-CM   1. Chronic pain of right ankle  M25.571 719.47    G89.29 338.29   2. Achilles tendinitis, right leg  M76.61 726.71   3. Acquired posterior equinus, right  M21.861 736.72   4. S/P Achilles tendon repair  Z98.890 V45.89   5. SI (sacroiliac) joint dysfunction  M53.3 724.6       Subjective Alfreda Mcgowan reports pain is 5/10 at present. Pt was busy so it was sore by the end of the day yesterday with increased swelling as well. Pt notes she does have an [employer] stationary bike at home which she started using yesterday.     Objective     See Exercise, Manual, Modality, and/or Vestibular Logs for complete treatment.     Patient Education: cues for therex/theracts/NMR for form, reps, holds, and for avoiding compensation and pain    Assessment/Plan Pt tolerated manual well without any significant increase in pain. Added bike. Pt can continue with biking at home and/or in PT. Ended with ice.     PN at next session.     Progress per Plan of Care and Progress strengthening /stabilization /functional activity per protocol.            Timed:         Manual Therapy:   10      mins  71789;     Therapeutic Exercise:   13      mins  82253;     Neuromuscular Marty:  15      mins  67947;    Therapeutic Activity:          mins  49950;     Gait Training:           mins  67251;     Ultrasound:          mins  22451;    Ionto                                   mins   52964  Self Care                            mins   34415    Un-Timed:  Electrical Stimulation:         mins  30644 ( );  Traction          mins 04273  Isabella  Repos                   mins  56592  Re-Eval                               mins  21628    Timed Treatment:  38    mins   Total Treatment:    38   mins          Flavia Fleming, PT    Physical Therapist

## 2025-05-27 ENCOUNTER — TREATMENT (OUTPATIENT)
Dept: PHYSICAL THERAPY | Facility: CLINIC | Age: 55
End: 2025-05-27
Payer: COMMERCIAL

## 2025-05-27 DIAGNOSIS — M76.61 ACHILLES TENDINITIS, RIGHT LEG: ICD-10-CM

## 2025-05-27 DIAGNOSIS — G89.29 CHRONIC PAIN OF RIGHT ANKLE: Primary | ICD-10-CM

## 2025-05-27 DIAGNOSIS — M53.3 SI (SACROILIAC) JOINT DYSFUNCTION: ICD-10-CM

## 2025-05-27 DIAGNOSIS — M25.571 CHRONIC PAIN OF RIGHT ANKLE: Primary | ICD-10-CM

## 2025-05-27 DIAGNOSIS — Z98.890 S/P ACHILLES TENDON REPAIR: ICD-10-CM

## 2025-05-27 DIAGNOSIS — M21.861 ACQUIRED POSTERIOR EQUINUS, RIGHT: ICD-10-CM

## 2025-05-27 PROCEDURE — 97140 MANUAL THERAPY 1/> REGIONS: CPT | Performed by: PHYSICAL THERAPIST

## 2025-05-27 PROCEDURE — 97112 NEUROMUSCULAR REEDUCATION: CPT | Performed by: PHYSICAL THERAPIST

## 2025-05-27 PROCEDURE — 97110 THERAPEUTIC EXERCISES: CPT | Performed by: PHYSICAL THERAPIST

## 2025-05-27 NOTE — PROGRESS NOTES
Physical Therapy Treatment Note/Progress Note  [address] [place] [address]      Patient: Alfreda Mcgowan   : 1970  Diagnosis/ICD-10 Code:  Chronic pain of right ankle [M25.571, G89.29]  Referring practitioner: MARY Emanuel DPM  Date of Initial Visit: Type: THERAPY  Noted: 2025  Today's Date: 2025  Patient seen for 8 sessions           Visit Diagnoses:     ICD-10-CM ICD-9-CM   1. Chronic pain of right ankle  M25.571 719.47    G89.29 338.29   2. Achilles tendinitis, right leg  M76.61 726.71   3. Acquired posterior equinus, right  M21.861 736.72   4. S/P Achilles tendon repair  Z98.890 V45.89   5. SI (sacroiliac) joint dysfunction  M53.3 724.6       Subjective Questionnaire: LEFS: 43/80 = 53.75% ability/46.25% limited     Subjective Alfreda Mcgowan reports she had some random stabbing pains this week with sitting. Pt feels it was nerve pain. Pt states she did have a full weekend with the grand daughter who is on the go all the time at 4.6 y/o. Pt reports it's tight right now, but no pain. Pt has no problem washing/dressing/grooming, rising if from regular height seat; driving , wearing a shoe, house work and sleep are all somewhat improved (occasionally wakes).     Aggravating/functional factors: sitting if leg is dependent too long >1 hr or so, placement of the foot if doing steps (needs full foot flat on step), standing >2 hrs, walking > 15-20', squatting, lifting/carrying too far especially her grandchild (~30#), stairs/steps, in/out of low or really high vehicle, rising anything different than normal height, occasionally sleeping, house work, driving slightly difficult (depends on footwear at times), wearing a shoe (sometimes needs backless shoe)    Pain: 0/10 current, 0/10 at best, 8/10 at worst, 5 on average    Objective     Active Range of Motion      Right Ankle/Foot   Dorsiflexion: 5 degrees initially this morning, then increased to 11 degrees after ex  Plantar  flexion: 57 degrees   Inversion: 43 degrees  Eversion: 18 degrees     Swelling      Right Ankle/Foot   Metatarsal heads: 24.3 cm  Figure 8: 58.2 cm  Malleoli: 31.7 cm      Additional ROM details:   Hips/knees/L ankle grossly WFL     Strength tested in sitting:   Hip flex 4+ R/5 L  Hip ext 4+ R/5 L  Hip abd 4 - 4 R/4- L  Hip add 4+ to 5 B  R quad 5 B  R hams 4+ R/5 L    See Exercise, Manual, Modality, and/or Vestibular Logs for complete treatment.     Patient Education: cues for therex/theracts/NMR for form, reps, holds, and for avoiding compensation and pain    Assessment/Plan Pt is 11 wks and 1 day after surgery. Overall, pain is reduced and mobility/strength are steadily improving. Swelling is mildly increased at Fig 8 and malleoli, but reduced at met heads.     Pt continues to have difficulty and/or pain with: sitting if leg is dependent too long >1 hr or so, placement of the foot if doing steps (needs full foot flat on step), standing >2 hrs, walking > 15-20', squatting, lifting/carrying too far especially her grandchild (~30#), stairs/steps, in/out of low or really high vehicle, rising anything different than normal height, occasionally sleeping, house work, driving slightly difficult (depends on footwear at times), wearing a shoe (sometimes needs backless shoe)    Pt has met 4 goals and partially met 6 goals of the goals assessed today. Current Goals/POC continue to be appropriate for this pt. Recommend continued skilled PT with current POC/interventions until goal attainment, I HEP and return to PLOF. Current POC expires 7/20/25. Pt will require further auth if exceeds 20 visits.     Goals  Plan Goals: STGs in 4 weeks:  Decrease pain to 5/10 on average Met  Increase LE ROM by 5-10 degrees where limited as much Met  Increase LE strength to 4/5 Partially Met     LTGs by discharge  Increase trunk/LE ROM to WFL/WNL  Increase LE strength to 5/5 Partially Met  Pt will be able to ascend/descend stairs reciprocally  with or without use of rail(s) and with minimal difficulty or pain  Pt will be able to sit/drive/ride for 30-60 mins without difficulty or pain Partially Met   Pt will be able to stand 30-60 mins for basic ADLs without difficulty or pain Met  Pt will be able to walk 30-60 mins for grocery shopping without difficulty, pain or LOB  Pt will be able to wash/dress/groom without difficulty or increased pain Met  Pt will be able to push/pull vacuum, grocery cart or heavy doors without difficulty or increased pain Partially Met  Pt will be able to lift/carry laundry baskets, pots/pans, garbage or grocery bags without difficulty or increased pain Partially Met  Pt will be able to sleep full nights most nights without waking from LBP/LE symptoms Partially Met    Progress per Plan of Care and Progress strengthening /stabilization /functional activity            Timed:         Manual Therapy:   10      mins  41148;     Therapeutic Exercise:   16      mins  51735;     Neuromuscular Marty:  14      mins  77139;    Therapeutic Activity:          mins  83425;     Gait Training:           mins  95948;     Ultrasound:          mins  79068;    Ionto                                   mins   83019  Self Care                            mins   97580    Un-Timed:  Electrical Stimulation:         mins  38469 ( );  Traction          mins 60795  Canalith Repos                   mins  85527  Re-Eval                               mins  88136    Timed Treatment:  40    mins   Total Treatment:      40  mins          Flavia Fleming, PT    Physical Therapist

## 2025-05-29 ENCOUNTER — TREATMENT (OUTPATIENT)
Dept: PHYSICAL THERAPY | Facility: CLINIC | Age: 55
End: 2025-05-29
Payer: COMMERCIAL

## 2025-05-29 DIAGNOSIS — M76.61 ACHILLES TENDINITIS, RIGHT LEG: ICD-10-CM

## 2025-05-29 DIAGNOSIS — M21.861 ACQUIRED POSTERIOR EQUINUS, RIGHT: ICD-10-CM

## 2025-05-29 DIAGNOSIS — Z98.890 S/P ACHILLES TENDON REPAIR: ICD-10-CM

## 2025-05-29 DIAGNOSIS — M25.571 CHRONIC PAIN OF RIGHT ANKLE: Primary | ICD-10-CM

## 2025-05-29 DIAGNOSIS — M53.3 SI (SACROILIAC) JOINT DYSFUNCTION: ICD-10-CM

## 2025-05-29 DIAGNOSIS — G89.29 CHRONIC PAIN OF RIGHT ANKLE: Primary | ICD-10-CM

## 2025-05-29 NOTE — PROGRESS NOTES
Physical Therapy Treatment Note  [address] [place] [address]      Patient: Alfreda Mcgowan   : 1970  Diagnosis/ICD-10 Code:  Chronic pain of right ankle [M25.571, G89.29]  Referring practitioner: MARY Emanuel DPM  Date of Initial Visit: Type: THERAPY  Noted: 2025  Today's Date: 2025  Patient seen for 9 sessions           Visit Diagnoses:     ICD-10-CM ICD-9-CM   1. Chronic pain of right ankle  M25.571 719.47    G89.29 338.29   2. Achilles tendinitis, right leg  M76.61 726.71   3. Acquired posterior equinus, right  M21.861 736.72   4. S/P Achilles tendon repair  Z98.890 V45.89   5. SI (sacroiliac) joint dysfunction  M53.3 724.6       Subjective Alfreda Mcgowan reports she's a little more sore today than she has been ~5-6/10 at the distal lower leg and post lat calcaneus. Pt has been doing bike at home, but has not yet done it this morning.     Objective     See Exercise, Manual, Modality, and/or Vestibular Logs for complete treatment.     Patient Education: cues for therex/theracts/NMR for form, reps, holds, and for avoiding compensation and pain; discussed general progression of protocol    Assessment/Plan Increased reps on leg press today and increased resistance to 1.5# for leg tasks without any increased pain. Pt would like to try 1x/wk for the next couple of weeks due to limited visits. Pt declined modalities at end of session. Pt RMD 25. Add back prone tasks next session.     At wk 13-16 (starting 25): add Tband, calf press, SLS, eccentric drops    Progress per Plan of Care and Progress strengthening /stabilization /functional activity            Timed:         Manual Therapy:   9      mins  18243;     Therapeutic Exercise:   13      mins  97428;     Neuromuscular Marty:  10      mins  28299;    Therapeutic Activity:          mins  34751;     Gait Training:           mins  22321;     Ultrasound:          mins  42380;    Ionto                                    mins   90413  Self Care                            mins   00123    Un-Timed:  Electrical Stimulation:         mins  29475 ( );  Traction          mins 83019  Canalith Repos                   mins  18742  Re-Eval                               mins  22832    Timed Treatment:  32    mins   Total Treatment:     32   mins          Flavia Fleming, PT    Physical Therapist

## 2025-06-05 ENCOUNTER — OFFICE VISIT (OUTPATIENT)
Age: 55
End: 2025-06-05
Payer: COMMERCIAL

## 2025-06-05 VITALS — HEIGHT: 68 IN | OXYGEN SATURATION: 98 % | BODY MASS INDEX: 39.4 KG/M2 | WEIGHT: 260 LBS | HEART RATE: 68 BPM

## 2025-06-05 DIAGNOSIS — M77.51 RETROCALCANEAL BURSITIS, RIGHT: ICD-10-CM

## 2025-06-05 DIAGNOSIS — M76.61 ACHILLES TENDINITIS, RIGHT LEG: ICD-10-CM

## 2025-06-05 DIAGNOSIS — M79.671 RIGHT FOOT PAIN: Primary | ICD-10-CM

## 2025-06-05 DIAGNOSIS — E66.01 MORBID OBESITY WITH BMI OF 40.0-44.9, ADULT: ICD-10-CM

## 2025-06-05 PROCEDURE — 99024 POSTOP FOLLOW-UP VISIT: CPT | Performed by: PODIATRIST

## 2025-06-05 RX ORDER — METHYLPREDNISOLONE 4 MG/1
TABLET ORAL
Qty: 21 TABLET | Refills: 0 | Status: SHIPPED | OUTPATIENT
Start: 2025-06-05

## 2025-06-05 NOTE — PROGRESS NOTES
06/05/2025  Foot and Ankle Surgery - Established Patient/Follow-up  Provider: Dr. Donald Emanuel DPM  Location: [facility]    Subjective:  Alfreda Mcgowan is a 54 y.o. female.     Chief Complaint   Patient presents with    Right Foot - Pain, Post-op, Edema     3/10/25 NADIRA  Partial resection of calcaneal bone, right    Achilles tendon repair, right  INCREASED PAIN AND SWELLING    Post-op     Aparna Larios MD3/10/25         History of Present Illness  The patient presents for evaluation of foot pain.    She began experiencing pain in her foot on Sunday, which was accompanied by the development of a small, palpable nodule. The swelling has progressively worsened since its onset. On Monday, while at work, she experienced a sudden, intense pain in her foot, which was severe enough to cause her to grimace. Her supervisor, noticing her discomfort, inquired about the cause. She has been applying ice packs to the affected area after returning home from work. She has also been attending physical therapy sessions. She reports that the swelling is not as pronounced in the mornings. She recently purchased a new pair of tennis shoes from [employer], which she wore throughout the previous week, except for Friday when she switched to a different pair of shoes towards the end of the day. However, due to the swelling and pain, she has been unable to wear her tennis shoes since Saturday or Sunday. She has a stationary bike at home, which she has been using for low-impact exercise. She recalls that her foot appeared significantly swollen on Monday night and felt warm to the touch.      No Known Allergies    Current Outpatient Medications on File Prior to Visit   Medication Sig Dispense Refill    baclofen (LIORESAL) 10 MG tablet Take 1 tablet by mouth Daily As Needed for Muscle Spasms.      cetirizine (zyrTEC) 10 MG tablet Take 1 tablet by mouth Daily.      hydroCHLOROthiazide (HYDRODIURIL) 25 MG tablet Take 1  "tablet by mouth Daily.      HYDROcodone-acetaminophen (NORCO) 7.5-325 MG per tablet Take 1 tablet by mouth Every 6 (Six) Hours As Needed for Moderate Pain (Pain). 28 tablet 0    levothyroxine (SYNTHROID, LEVOTHROID) 125 MCG tablet Take 1 tablet by mouth Daily.      LORazepam (ATIVAN) 0.5 MG tablet Take 1 tablet by mouth Every 8 (Eight) Hours As Needed for Anxiety.      rosuvastatin (CRESTOR) 5 MG tablet Take 1 tablet by mouth Every Night.      Semaglutide,0.25 or 0.5MG/DOS, (OZEMPIC) 2 MG/3ML solution pen-injector Inject 1 mg under the skin into the appropriate area as directed 1 (One) Time Per Week.      sertraline (ZOLOFT) 100 MG tablet Take 1 tablet by mouth Every Night.       No current facility-administered medications on file prior to visit.       Objective   Pulse 68   Ht 172.7 cm (68\")   Wt 118 kg (260 lb)   SpO2 98%   BMI 39.53 kg/m²     Foot/Ankle Exam  Physical Exam  GENERAL  Appearance:  appears stated age and obese  Orientation:  AAOx3  Affect:  appropriate  Gait:  unimpaired  Assistance:  independent  Right shoe gear: casual shoe  Left shoe gear: casual shoe     VASCULAR      Right Foot Vascularity   Normal vascular exam    Dorsalis pedis:  2+  Posterior tibial:  2+  Skin temperature:  warm  Edema grading:  None  CFT:  < 3 seconds  Pedal hair growth:  Present  Varicosities:  none      Left Foot Vascularity   Normal vascular exam    Dorsalis pedis:  2+  Posterior tibial:  2+  Skin temperature:  warm  Edema grading:  None  CFT:  < 3 seconds  Pedal hair growth:  Present  Varicosities:  none     NEUROLOGIC      Right Foot Neurologic   Normal sensation    Light touch sensation: normal  Vibratory sensation: normal  Hot/Cold sensation: normal  Normal reflexes    Achilles reflex:  2+  Babinski reflex:  2+      Left Foot Neurologic   Normal sensation    Light touch sensation: normal  Vibratory sensation: normal  Hot/Cold sensation:  normal  Normal reflexes    Achilles reflex:  2+  Babinski reflex:  2+   "   MUSCULOSKELETAL      Right Foot Musculoskeletal   Ecchymosis:  none  Arch:  Normal      Left Foot Musculoskeletal   Ecchymosis:  none  Arch:  Normal     MUSCLE STRENGTH      Right Foot Muscle Strength   Normal strength    Foot dorsiflexion:  5  Foot plantar flexion:  5  Foot inversion:  5  Foot eversion:  5      Left Foot Muscle Strength   Normal strength    Foot dorsiflexion:  5  Foot plantar flexion:  5  Foot inversion:  5  Foot eversion:  5     RANGE OF MOTION      Right Foot Range of Motion   Foot and ankle ROM within normal limits        Left Foot Range of Motion   Foot and ankle ROM within normal limits       DERMATOLOGIC       Right Foot Dermatologic   Skin  Right foot skin is intact.       Left Foot Dermatologic   Skin  Left foot skin is intact.      Right foot additional comments: Incision sites are dry and stable with intact sutures and staples. No evidence of dehiscence or infection. No significant pain or limitation. Minimal swelling to the retrocalcaneal region.     4/23/24: No significant discomfort with palpation involving the posterior aspect of the heel.  Moderate tightness and discomfort involving the operative site.  No progressive deformity.  Strength is improving to the posterior aspect of the leg.  No significant calf pain     6/25/24: No significant discomfort involving the calf region.  Moderate discomfort involving the posterior medial aspect of the calcaneus with moderate swelling involving the retrocalcaneal bursa.  Achilles tendon appears to be intact with mild hypertrophy     8/8/24: Discomfort with palpation involving the posterior medial aspect of the heel.  Moderate swelling.  No obvious defect involving the Achilles tendon.  No progressive deformity or instability.  No prominent residual equinus contracture.     9/19/24: Pain with palpation to the posterior medial aspect of the calcaneus.  Continued swelling and discomfort.  No christin deficit.     2/4/25: Continued pain with  palpation involving the posterior aspect of the calcaneus and Achilles tendon insertion with significant Achilles tendon hypertrophy.  No christin deficit appreciated.     3/24/25: Incision site is dry and stable with intact sutures.  No evidence of dehiscence or infection.  Less hypertrophy involving the posterior aspect of the heel.  Achilles tendon appears to be intact.     5/5/25: Continued improvement with less swelling and discomfort involving the posterior aspect of the heel.  Achilles tendon remains intact.  Minimal hypertrophy.  No gross signs of inflammation    6/5/25: Increased swelling involving posterior superior aspect of the calcaneus.  Incision sites are well-healed.  No significant erythema.  Discomfort with palpation along the posterior lateral aspect of the ankle and heel.  Achilles tendon remains intact without deficit.      Results  Imaging  X-ray of the foot shows no significant changes from previous imaging.    Assessment & Plan   Diagnoses and all orders for this visit:    1. Right foot pain (Primary)  -     XR Foot 3+ View Right    2. Achilles tendinitis, right leg    3. Retrocalcaneal bursitis, right    4. Morbid obesity with BMI of 40.0-44.9, adult    Other orders  -     methylPREDNISolone (MEDROL) 4 MG dose pack; Take as directed  Dispense: 21 tablet; Refill: 0      Assessment & Plan    The discomfort is likely due to a deep suture at the incision site, which is causing heightened sensitivity. The swelling is more pronounced than during the previous visit, but there are no signs of infection. The tendon appears intact. The inflammation and scar tissue from the initial surgery are contributing to the current symptoms. The x-ray does not show any significant changes. The goal is to manage the symptoms without resorting to repeat surgery. Desensitization treatment was recommended. Warm water soaks were suggested to promote blood flow and aid in healing. She was advised to wear a boot for the  next 1 to 2 weeks during standing activities. A steroid injection was discussed as a potential treatment option, but it was decided to postpone this for now. An oral steroid will be prescribed instead. She was advised to monitor her condition closely and report any significant drainage or redness. Physical therapy sessions will be temporarily halted, but she can continue with home exercises. She was also advised to engage in low-impact exercises such as swimming or water aerobics if she has access to a pool.    Follow-up  The patient will follow up on 06/23/2025.             Patient or patient representative verbalized consent for the use of Ambient Listening during the visit with  MARY Emanuel DPM for chart documentation. 6/5/2025  10:27 EDT    MARY Emanuel DPM

## 2025-06-11 ENCOUNTER — TELEPHONE (OUTPATIENT)
Dept: PHYSICAL THERAPY | Facility: CLINIC | Age: 55
End: 2025-06-11
Payer: COMMERCIAL

## 2025-06-11 NOTE — TELEPHONE ENCOUNTER
PATIENT CALLED BACK SHE HAS COMPLETED MEDS ORDERED BY  AND IT HAS BEEN 2 WEEKS SINCE LAST PT VISIT. SHE WOULD LIKE TO COME TOMORROW FOR PT.

## 2025-06-12 ENCOUNTER — TREATMENT (OUTPATIENT)
Dept: PHYSICAL THERAPY | Facility: CLINIC | Age: 55
End: 2025-06-12
Payer: COMMERCIAL

## 2025-06-12 DIAGNOSIS — M25.571 CHRONIC PAIN OF RIGHT ANKLE: Primary | ICD-10-CM

## 2025-06-12 DIAGNOSIS — M76.61 ACHILLES TENDINITIS, RIGHT LEG: ICD-10-CM

## 2025-06-12 DIAGNOSIS — G89.29 CHRONIC PAIN OF RIGHT ANKLE: Primary | ICD-10-CM

## 2025-06-12 DIAGNOSIS — M53.3 SI (SACROILIAC) JOINT DYSFUNCTION: ICD-10-CM

## 2025-06-12 DIAGNOSIS — M21.861 ACQUIRED POSTERIOR EQUINUS, RIGHT: ICD-10-CM

## 2025-06-12 DIAGNOSIS — Z98.890 S/P ACHILLES TENDON REPAIR: ICD-10-CM

## 2025-06-12 PROCEDURE — 97140 MANUAL THERAPY 1/> REGIONS: CPT | Performed by: PHYSICAL THERAPIST

## 2025-06-12 PROCEDURE — 97014 ELECTRIC STIMULATION THERAPY: CPT | Performed by: PHYSICAL THERAPIST

## 2025-06-12 NOTE — PROGRESS NOTES
Physical Therapy Treatment Note  [address] [place] [address]      Patient: Alfreda Mcgowan   : 1970  Diagnosis/ICD-10 Code:  Chronic pain of right ankle [M25.571, G89.29]  Referring practitioner: MARY Emanuel DPM  Date of Initial Visit: Type: THERAPY  Noted: 2025  Today's Date: 2025  Patient seen for 10 sessions           Visit Diagnoses:     ICD-10-CM ICD-9-CM   1. Chronic pain of right ankle  M25.571 719.47    G89.29 338.29   2. Achilles tendinitis, right leg  M76.61 726.71   3. Acquired posterior equinus, right  M21.861 736.72   4. S/P Achilles tendon repair  Z98.890 V45.89   5. SI (sacroiliac) joint dysfunction  M53.3 724.6       Subjective Alfreda Mcgowan reports on 25 pt was standing at work and felt a shot of pain after a weekend of some increased pain, but with no specific incident or overdoing. Pt did note a palpable nodule and states that night the swelling significantly increased. Pt had recently gotten new tennis shoes from [employer] which she wore most of the prior week except Friday when she switched toward the end of the day. Pt was not able to wear the tennis shoes since then due to the increased swelling. Pt called the doctor and got in to be seen on 25. Pt states the tendon is still intact and pt was given a Medrol dose pack which she completed on Tuesday. Pt has been alternating ice and warm water soaks. Pt has been able to continue with her exs as tolerated. Pt is glad Dr. Emanuel didn't make her go back in the boot. They discussed possible injex in the future if still having issues. Pt will follow-up with Dr. Emanuel on 25.     Pain: 6    Objective   56.6 cm    See Exercise, Manual, Modality, and/or Vestibular Logs for complete treatment.     Patient Education: cues for therex/theracts/NMR for form, reps, holds, and for avoiding compensation and pain    Assessment/Plan Pt performed basic ROM tasks while PT was with prior  patient.   PT performed manual and ended with modalities to avoid overdoing today. Reduced swelling and pain noted after manual and modalities.     Will Progress per Plan of Care and Progress strengthening /stabilization /functional activity per protocol and as tolerated.            Timed:         Manual Therapy:    23     mins  15015;     Therapeutic Exercise:         mins  76155;     Neuromuscular Marty:        mins  88656;    Therapeutic Activity:          mins  44436;     Gait Training:           mins  28579;     Ultrasound:          mins  27058;    Ionto                                   mins   14510  Self Care                            mins   45261    Un-Timed:  Electrical Stimulation:  15       mins  25126 ( );  Traction          mins 42753  Canalith Repos                   mins  29814  Re-Eval                               mins  03843    Timed Treatment:   23   mins   Total Treatment:     38   mins          Flavia Fleming, PT    Physical Therapist

## 2025-06-19 ENCOUNTER — TREATMENT (OUTPATIENT)
Dept: PHYSICAL THERAPY | Facility: CLINIC | Age: 55
End: 2025-06-19
Payer: COMMERCIAL

## 2025-06-19 DIAGNOSIS — M25.571 CHRONIC PAIN OF RIGHT ANKLE: Primary | ICD-10-CM

## 2025-06-19 DIAGNOSIS — G89.29 CHRONIC PAIN OF RIGHT ANKLE: Primary | ICD-10-CM

## 2025-06-19 DIAGNOSIS — M76.61 ACHILLES TENDINITIS, RIGHT LEG: ICD-10-CM

## 2025-06-19 DIAGNOSIS — Z98.890 S/P ACHILLES TENDON REPAIR: ICD-10-CM

## 2025-06-19 DIAGNOSIS — M53.3 SI (SACROILIAC) JOINT DYSFUNCTION: ICD-10-CM

## 2025-06-19 DIAGNOSIS — M21.861 ACQUIRED POSTERIOR EQUINUS, RIGHT: ICD-10-CM

## 2025-06-19 NOTE — PROGRESS NOTES
Physical Therapy Treatment Note  [address] [place] [address]      Patient: Alfreda Mcgowan   : 1970  Diagnosis/ICD-10 Code:  Chronic pain of right ankle [M25.571, G89.29]  Referring practitioner: MARY Emanuel DPM  Date of Initial Visit: Type: THERAPY  Noted: 2025  Today's Date: 2025  Patient seen for 11 sessions           Visit Diagnoses:     ICD-10-CM ICD-9-CM   1. Chronic pain of right ankle  M25.571 719.47    G89.29 338.29   2. Achilles tendinitis, right leg  M76.61 726.71   3. Acquired posterior equinus, right  M21.861 736.72   4. S/P Achilles tendon repair  Z98.890 V45.89   5. SI (sacroiliac) joint dysfunction  M53.3 724.6       Subjective Alfreda Mcgowan reports she is feeling better with pain ~3 at present. Pt states she got another ice pack so she can leave one at work to ice as able.     Objective   Observation: visually reduced edema from last session; pt wearing ankle compression socks    See Exercise, Manual, Modality, and/or Vestibular Logs for complete treatment.     Patient Education: cues for therex/theracts/NMR for form, reps, holds, and for avoiding compensation and pain; instr to add DL heel raise to eccentric drops at home to neutral working up to 2-3 reps of 15 over time using pain and swelling as a guide     Assessment/Plan  Pt tolerated session well with progressions as tolerated per flow sheet. No reports of any significant increase in pain and pt will ice at home.     Progress per Plan of Care and Progress strengthening /stabilization /functional activity            Timed:         Manual Therapy:  13       mins  54498;     Therapeutic Exercise:  10       mins  59857;     Neuromuscular Marty:  4     mins  44636;    Therapeutic Activity:    8    mins  72673;     Gait Training:           mins  01416;     Ultrasound:          mins  77399;    Ionto                                   mins   02908  Self Care                            mins    74448    Un-Timed:  Electrical Stimulation:         mins  16886 ( );  Traction          mins 34195  Canalith Repos                   mins  03754  Re-Eval                               mins  28395    Timed Treatment:   35   mins   Total Treatment:     35   mins          Flavia Fleming PT    Physical Therapist

## 2025-06-23 ENCOUNTER — OFFICE VISIT (OUTPATIENT)
Age: 55
End: 2025-06-23
Payer: COMMERCIAL

## 2025-06-23 VITALS — HEIGHT: 68 IN | HEART RATE: 60 BPM | OXYGEN SATURATION: 96 % | BODY MASS INDEX: 39.4 KG/M2 | WEIGHT: 260 LBS

## 2025-06-23 DIAGNOSIS — M77.51 RETROCALCANEAL BURSITIS, RIGHT: ICD-10-CM

## 2025-06-23 DIAGNOSIS — L90.5 SCAR TISSUE: ICD-10-CM

## 2025-06-23 DIAGNOSIS — M79.671 RIGHT FOOT PAIN: Primary | ICD-10-CM

## 2025-06-23 DIAGNOSIS — M76.61 ACHILLES TENDINITIS, RIGHT LEG: ICD-10-CM

## 2025-06-23 DIAGNOSIS — E66.01 MORBID OBESITY WITH BMI OF 40.0-44.9, ADULT: ICD-10-CM

## 2025-06-23 PROCEDURE — 99213 OFFICE O/P EST LOW 20 MIN: CPT | Performed by: PODIATRIST

## 2025-06-24 NOTE — PROGRESS NOTES
06/23/2025  Foot and Ankle Surgery - Established Patient/Follow-up  Provider: Dr. Donald Emanuel DPM  Location: [facility]    Subjective:  Alfreda Mcgowan is a 54 y.o. female.     Chief Complaint   Patient presents with    Right Foot - Follow-up, Edema, Pain     3/10/25 NADIRA  Partial resection of calcaneal bone, right    Achilles tendon repair, right  INCREASED PAIN AND SWELLING    Follow-Up     Aparna Larios MD  6/3/25       History of Present Illness  The patient presents for evaluation of tendon issues.    She reports a significant improvement in her condition following the administration of a Medrol Dosepak approximately 2 weeks ago. She is not aware of any specific trigger that may have precipitated her previous setback. She has no history of scar tissue complications from past surgeries or injuries. She has resumed wearing tennis shoes, which she was previously unable to do. She has also been researching dietary modifications to reduce inflammation and has incorporated these into her diet. She has received positive feedback from two individuals regarding her improved gait.    FAMILY HISTORY  Her mother had issues with scar tissue formation after surgeries.    MEDICATIONS  Medrol Dosepak      No Known Allergies    Current Outpatient Medications on File Prior to Visit   Medication Sig Dispense Refill    baclofen (LIORESAL) 10 MG tablet Take 1 tablet by mouth Daily As Needed for Muscle Spasms.      cetirizine (zyrTEC) 10 MG tablet Take 1 tablet by mouth Daily.      hydroCHLOROthiazide (HYDRODIURIL) 25 MG tablet Take 1 tablet by mouth Daily.      HYDROcodone-acetaminophen (NORCO) 7.5-325 MG per tablet Take 1 tablet by mouth Every 6 (Six) Hours As Needed for Moderate Pain (Pain). 28 tablet 0    levothyroxine (SYNTHROID, LEVOTHROID) 125 MCG tablet Take 1 tablet by mouth Daily.      LORazepam (ATIVAN) 0.5 MG tablet Take 1 tablet by mouth Every 8 (Eight) Hours As Needed for Anxiety.      rosuvastatin  "(CRESTOR) 5 MG tablet Take 1 tablet by mouth Every Night.      Semaglutide,0.25 or 0.5MG/DOS, (OZEMPIC) 2 MG/3ML solution pen-injector Inject 1 mg under the skin into the appropriate area as directed 1 (One) Time Per Week.      sertraline (ZOLOFT) 100 MG tablet Take 1 tablet by mouth Every Night.       No current facility-administered medications on file prior to visit.       Objective   Pulse 60   Ht 172.7 cm (68\")   Wt 118 kg (260 lb)   SpO2 96%   BMI 39.53 kg/m²     Foot/Ankle Exam  Physical Exam  GENERAL  Appearance:  appears stated age and obese  Orientation:  AAOx3  Affect:  appropriate  Gait:  unimpaired  Assistance:  independent  Right shoe gear: casual shoe  Left shoe gear: casual shoe     VASCULAR      Right Foot Vascularity   Normal vascular exam    Dorsalis pedis:  2+  Posterior tibial:  2+  Skin temperature:  warm  Edema grading:  None  CFT:  < 3 seconds  Pedal hair growth:  Present  Varicosities:  none      Left Foot Vascularity   Normal vascular exam    Dorsalis pedis:  2+  Posterior tibial:  2+  Skin temperature:  warm  Edema grading:  None  CFT:  < 3 seconds  Pedal hair growth:  Present  Varicosities:  none     NEUROLOGIC      Right Foot Neurologic   Normal sensation    Light touch sensation: normal  Vibratory sensation: normal  Hot/Cold sensation: normal  Normal reflexes    Achilles reflex:  2+  Babinski reflex:  2+      Left Foot Neurologic   Normal sensation    Light touch sensation: normal  Vibratory sensation: normal  Hot/Cold sensation:  normal  Normal reflexes    Achilles reflex:  2+  Babinski reflex:  2+     MUSCULOSKELETAL      Right Foot Musculoskeletal   Ecchymosis:  none  Arch:  Normal      Left Foot Musculoskeletal   Ecchymosis:  none  Arch:  Normal     MUSCLE STRENGTH      Right Foot Muscle Strength   Normal strength    Foot dorsiflexion:  5  Foot plantar flexion:  5  Foot inversion:  5  Foot eversion:  5      Left Foot Muscle Strength   Normal strength    Foot dorsiflexion:  " 5  Foot plantar flexion:  5  Foot inversion:  5  Foot eversion:  5     RANGE OF MOTION      Right Foot Range of Motion   Foot and ankle ROM within normal limits        Left Foot Range of Motion   Foot and ankle ROM within normal limits       DERMATOLOGIC       Right Foot Dermatologic   Skin  Right foot skin is intact.       Left Foot Dermatologic   Skin  Left foot skin is intact.      Right foot additional comments: Incision sites are dry and stable with intact sutures and staples. No evidence of dehiscence or infection. No significant pain or limitation. Minimal swelling to the retrocalcaneal region.     4/23/24: No significant discomfort with palpation involving the posterior aspect of the heel.  Moderate tightness and discomfort involving the operative site.  No progressive deformity.  Strength is improving to the posterior aspect of the leg.  No significant calf pain     6/25/24: No significant discomfort involving the calf region.  Moderate discomfort involving the posterior medial aspect of the calcaneus with moderate swelling involving the retrocalcaneal bursa.  Achilles tendon appears to be intact with mild hypertrophy     8/8/24: Discomfort with palpation involving the posterior medial aspect of the heel.  Moderate swelling.  No obvious defect involving the Achilles tendon.  No progressive deformity or instability.  No prominent residual equinus contracture.     9/19/24: Pain with palpation to the posterior medial aspect of the calcaneus.  Continued swelling and discomfort.  No christin deficit.     2/4/25: Continued pain with palpation involving the posterior aspect of the calcaneus and Achilles tendon insertion with significant Achilles tendon hypertrophy.  No christin deficit appreciated.     3/24/25: Incision site is dry and stable with intact sutures.  No evidence of dehiscence or infection.  Less hypertrophy involving the posterior aspect of the heel.  Achilles tendon appears to be intact.     5/5/25:  Continued improvement with less swelling and discomfort involving the posterior aspect of the heel.  Achilles tendon remains intact.  Minimal hypertrophy.  No gross signs of inflammation     6/5/25: Increased swelling involving posterior superior aspect of the calcaneus.  Incision sites are well-healed.  No significant erythema.  Discomfort with palpation along the posterior lateral aspect of the ankle and heel.  Achilles tendon remains intact without deficit.    6/23/25: Improvement with significantly less swelling and soft tissue rigidity involving the posterior superior aspect of the heel.  No erythema.  Achilles tendon remains grossly intact.  No progressive changes involving the foot.      Results      Assessment & Plan   Diagnoses and all orders for this visit:    1. Right foot pain (Primary)    2. Scar tissue    3. Retrocalcaneal bursitis, right    4. Achilles tendinitis, right leg    5. Morbid obesity with BMI of 40.0-44.9, adult      Assessment & Plan    The patient's condition has shown significant improvement since the last visit, with a notable reduction in tenderness and rigidity. The tendon remains intact, and there is no evidence of swelling or thickening. It is hypothesized that the release of scar tissue may have contributed to this improvement. She is advised to continue with deep tissue massage, stretching exercises, and wearing supportive footwear such as tennis shoes or wedges. She should avoid activities that could potentially disrupt the healing process, such as hiking or engaging in strenuous activities on uneven terrain. Dietary modifications to reduce inflammation are also recommended. A follow-up appointment is scheduled for 2 months from now, which can be postponed or canceled if her condition continues to improve.Reviewed proper basic stretching and manual therapy exercises along with appropriate shoes and activity.  Discussed proper use and/or avoidance of OTC anti-inflammatories.   Patient is to call with any additional issues or concerns.  Greater than 20 minutes was spent before, during, and after evaluation for patient care.    The encounter note is created with the use of AI technology.  I do apologize if there are typos and/or confusion within the note.  Please feel free to contact me or my office with any questions or concerns.    Follow-up  The patient will follow up in 2 months.             Patient or patient representative verbalized consent for the use of Ambient Listening during the visit with  MARY Emanuel DPM for chart documentation. 6/24/2025  07:23 EDT    MARY Emanuel DPM

## 2025-06-25 ENCOUNTER — TREATMENT (OUTPATIENT)
Dept: PHYSICAL THERAPY | Facility: CLINIC | Age: 55
End: 2025-06-25
Payer: COMMERCIAL

## 2025-06-25 DIAGNOSIS — M76.61 ACHILLES TENDINITIS, RIGHT LEG: ICD-10-CM

## 2025-06-25 DIAGNOSIS — M53.3 SI (SACROILIAC) JOINT DYSFUNCTION: ICD-10-CM

## 2025-06-25 DIAGNOSIS — M25.571 CHRONIC PAIN OF RIGHT ANKLE: Primary | ICD-10-CM

## 2025-06-25 DIAGNOSIS — G89.29 CHRONIC PAIN OF RIGHT ANKLE: Primary | ICD-10-CM

## 2025-06-25 DIAGNOSIS — Z98.890 S/P ACHILLES TENDON REPAIR: ICD-10-CM

## 2025-06-25 DIAGNOSIS — M21.861 ACQUIRED POSTERIOR EQUINUS, RIGHT: ICD-10-CM

## 2025-06-25 NOTE — PROGRESS NOTES
Physical Therapy Treatment Note  [address] [place] [address]      Patient: Alfreda Mcgowan   : 1970  Diagnosis/ICD-10 Code:  Chronic pain of right ankle [M25.571, G89.29]  Referring practitioner: MARY Emanuel DPM  Date of Initial Visit: Type: THERAPY  Noted: 2025  Today's Date: 2025  Patient seen for 12 sessions           Visit Diagnoses:     ICD-10-CM ICD-9-CM   1. Chronic pain of right ankle  M25.571 719.47    G89.29 338.29   2. Achilles tendinitis, right leg  M76.61 726.71   3. Acquired posterior equinus, right  M21.861 736.72   4. S/P Achilles tendon repair  Z98.890 V45.89   5. SI (sacroiliac) joint dysfunction  M53.3 724.6       Subjective Alfreda Mcgowan reports her ankle is better and pain is about 3 today. Pt feels like she's getting better slowly. Pt has been riding her bike at home and icing at home/work.     Objective     See Exercise, Manual, Modality, and/or Vestibular Logs for complete treatment.     Patient Education: cues for therex/theracts/NMR for form, reps, holds, and for avoiding compensation and pain; discussed that POC expires 25    Assessment/Plan Pt tolerated progressions fairly well. Pt was getting mildly sore at the ankle with foam cushion tasks. Pt walked briefly between that and the leg press machine. Pt would like to try HEP if able due to use of 12 out of 20 visits per year prior to authorization. Discussed that POC expires 25 and if no word from pt to return before that date, that she will be discharged. Pt to call if needs to schedule.  Pt will ice at home. Gait appears more smooth.       Progress per Plan of Care and Progress strengthening /stabilization /functional activity            Timed:         Manual Therapy:   10      mins  94383;     Therapeutic Exercise:   20     mins  73849;     Neuromuscular Marty:   8     mins  69203;    Therapeutic Activity:    10      mins  18861;     Gait Training:           mins  28903;      Ultrasound:          mins  60854;    Ionto                                   mins   80482  Self Care                            mins   28109    Un-Timed:  Electrical Stimulation:         mins  76652 ( );  Traction          mins 95354  Canalith Repos                   mins  54648  Re-Eval                               mins  24742    Timed Treatment:   48   mins   Total Treatment:     48   mins          Flavia Fleming, PT    Physical Therapist

## 2025-07-21 ENCOUNTER — DOCUMENTATION (OUTPATIENT)
Dept: PHYSICAL THERAPY | Facility: CLINIC | Age: 55
End: 2025-07-21
Payer: COMMERCIAL

## 2025-07-21 NOTE — PROGRESS NOTES
Physical Therapy Discharge Summary  [address] [place] [address]    Patient: Alfreda Mcgowan   : 1970  Diagnosis/ICD-10 Code:  Chronic pain of right ankle [M25.571, G89.29]  Referring practitioner: MARY Emanuel DPM  Date of Initial Visit: Type: THERAPY  Noted: 2025  Today's Date: 2025  Patient seen for 12 sessions       Discharge Status of Patient: pt wanted to try I HEP and never returned to PT. POC has .      Goals  Plan Goals: STGs in 4 weeks:  Decrease pain to 5/10 on average Met  Increase LE ROM by 5-10 degrees where limited as much Met  Increase LE strength to 4/5 Partially Met     LTGs by discharge  Increase trunk/LE ROM to WFL/WNL  Increase LE strength to 5/5 Partially Met  Pt will be able to ascend/descend stairs reciprocally with or without use of rail(s) and with minimal difficulty or pain  Pt will be able to sit/drive/ride for 30-60 mins without difficulty or pain Partially Met   Pt will be able to stand 30-60 mins for basic ADLs without difficulty or pain Met  Pt will be able to walk 30-60 mins for grocery shopping without difficulty, pain or LOB  Pt will be able to wash/dress/groom without difficulty or increased pain Met  Pt will be able to push/pull vacuum, grocery cart or heavy doors without difficulty or increased pain Partially Met  Pt will be able to lift/carry laundry baskets, pots/pans, garbage or grocery bags without difficulty or increased pain Partially Met  Pt will be able to sleep full nights most nights without waking from LBP/LE symptoms Partially Met     Discharge Plan: Continue with current home exercise program as instructed     Comments: Pt was given HEP during sessions.      Date of Discharge: 25            Flavia Fleming, PT  Physical Therapist  [place] License: [id number]

## (undated) DEVICE — 4.0MM OVAL SOLID CARBIDE BUR MEDIUM

## (undated) DEVICE — GOWN,SIRUS,POLYRNF,BRTHSLV,XL,30/CS: Brand: MEDLINE

## (undated) DEVICE — PAD,ABDOMINAL,5"X9",STERILE,LF,1/PK: Brand: MEDLINE INDUSTRIES, INC.

## (undated) DEVICE — BANDAGE,GAUZE,BULKEE II,4.5"X4.1YD,STRL: Brand: MEDLINE

## (undated) DEVICE — SUT VIC 3/0 CT2 27IN J232H

## (undated) DEVICE — SOL IRRIG NACL 1000ML

## (undated) DEVICE — MICRO DUAL CUT (9.0 X 0.38 X 25.0MM)

## (undated) DEVICE — KT SURG TURNOVER 050

## (undated) DEVICE — UNDERGLV SURG BIOGEL INDICAT PI SZ8 BLU

## (undated) DEVICE — SUT ETHIB TICRN SURGIDAC 2/0 CT2 30 BRN BX/36

## (undated) DEVICE — PENCL HND ROCKRSWTCH HOLSTR EZ CLEAN TP CRD 10FT

## (undated) DEVICE — PAD CAST SYN PROTOUCH RL 3IN NS

## (undated) DEVICE — GLV SURG BIOGEL M LTX PF 7 1/2

## (undated) DEVICE — SPLNT SCOTCHCAST QUICKSTEP DBL/SD/FELT FIBRGLS 4X30IN WHT

## (undated) DEVICE — APPL CHLORAPREP HI/LITE TINTED 10.5ML ORNG

## (undated) DEVICE — ANTIBACTERIAL UNDYED BRAIDED (POLYGLACTIN 910), SYNTHETIC ABSORBABLE SUTURE: Brand: COATED VICRYL

## (undated) DEVICE — DISPOSABLE TOURNIQUET CUFF 34"X4", 1-LINE, BLUE, STERILE, 1EA/PK, 10PK/CS: Brand: ASP MEDICAL

## (undated) DEVICE — SUT VICRYL 3/0 CT1 27IN J258H

## (undated) DEVICE — NDL HYPO PRECISIONGLIDE/REG 18G 1IN PNK

## (undated) DEVICE — BNDG ESMARK 4IN 12FT LF STRL BLU

## (undated) DEVICE — DRAPE,U/ SHT,SPLIT,PLAS,STERIL: Brand: MEDLINE

## (undated) DEVICE — SOL IRR NACL 0.9PCT BO 1000ML

## (undated) DEVICE — CVR HNDL LT SURG ACCSSRY BLU STRL

## (undated) DEVICE — DRSNG GZ CURAD XEROFORM PETROLTM OVERWRP 1X8IN STRL

## (undated) DEVICE — PK EXTREM 50

## (undated) DEVICE — SUT VIC 2/0 CT2 27IN J269H

## (undated) DEVICE — DISPOSABLE TOURNIQUET CUFF SINGLE BLADDER, SINGLE PORT AND QUICK CONNECT CONNECTOR: Brand: COLOR CUFF

## (undated) DEVICE — STAPLER, SKIN, 35W, A: Brand: MEDLINE INDUSTRIES, INC.

## (undated) DEVICE — PADDING,UNDERCAST,COTTON, 4"X4YD STERILE: Brand: MEDLINE

## (undated) DEVICE — SUT ETHLN 3/0 PS1 18IN 1663H

## (undated) DEVICE — INTENDED FOR TISSUE SEPARATION, AND OTHER PROCEDURES THAT REQUIRE A SHARP SURGICAL BLADE TO PUNCTURE OR CUT.: Brand: BARD-PARKER ® CARBON RIB-BACK BLADES

## (undated) DEVICE — SUT ETHLN 3/0 FS1 30IN 669H

## (undated) DEVICE — ADHS LIQ MASTISOL 2/3ML

## (undated) DEVICE — SPNG GZ WOVN 4X4IN 12PLY 10/BX STRL

## (undated) DEVICE — C-ARM: Brand: UNBRANDED

## (undated) DEVICE — GAUZE,SPONGE,FLUFF,6"X6.75",STRL,5/TRAY: Brand: MEDLINE

## (undated) DEVICE — THE STERILE LIGHT HANDLE COVER IS USED WITH STERIS SURGICAL LIGHTING AND VISUALIZATION SYSTEMS.

## (undated) DEVICE — GOWN,REINFORCE,POLY,SIRUS,BREATH SLV,XLG: Brand: MEDLINE

## (undated) DEVICE — THE STERILE CAMERA HANDLE COVER IS FOR USE WITH THE STERIS SURGICAL LIGHTING AND VISUALIZATION SYSTEMS.

## (undated) DEVICE — DECANTER: Brand: UNBRANDED

## (undated) DEVICE — MICRO SAGITTAL BLADE (9.5 X 0.4 X 25.5MM)